# Patient Record
Sex: FEMALE | Race: WHITE | Employment: PART TIME | URBAN - METROPOLITAN AREA
[De-identification: names, ages, dates, MRNs, and addresses within clinical notes are randomized per-mention and may not be internally consistent; named-entity substitution may affect disease eponyms.]

---

## 2017-02-02 DIAGNOSIS — R52 PAIN: ICD-10-CM

## 2017-02-02 NOTE — TELEPHONE ENCOUNTER
From: Kat Ohara  To: Joan Long MD  Sent: 2/2/2017 9:07 AM CST  Subject: Medication Renewal Request    Original authorizing provider: MD Kat Salvador would like a refill of the following medications:  HYDROcodone-acetamino

## 2017-02-03 RX ORDER — TRAMADOL HYDROCHLORIDE 50 MG/1
50 TABLET ORAL AS NEEDED
Qty: 60 TABLET | Refills: 3 | Status: SHIPPED | OUTPATIENT
Start: 2017-02-03 | End: 2017-06-07

## 2017-02-03 RX ORDER — HYDROCODONE BITARTRATE AND ACETAMINOPHEN 10; 325 MG/1; MG/1
1 TABLET ORAL EVERY 4 HOURS PRN
Qty: 100 TABLET | Refills: 0 | Status: SHIPPED | OUTPATIENT
Start: 2017-02-03 | End: 2017-05-09

## 2017-02-15 NOTE — PROGRESS NOTES
HPI:    Patient ID: Nakul Schaefer is a 64year old female. HPIpt here fu on mood disturbance and insomnia. Had difficulty with sleep is persistant. Review of Systems   Constitutional: Positive for fatigue.    Musculoskeletal: Positive for back pain #[id number]

## 2017-02-21 ENCOUNTER — TELEPHONE (OUTPATIENT)
Dept: INTERNAL MEDICINE CLINIC | Facility: CLINIC | Age: 57
End: 2017-02-21

## 2017-02-23 ENCOUNTER — PATIENT MESSAGE (OUTPATIENT)
Dept: INTERNAL MEDICINE CLINIC | Facility: CLINIC | Age: 57
End: 2017-02-23

## 2017-02-23 NOTE — TELEPHONE ENCOUNTER
From: César Cobian  To: Celine Rdz MD  Sent: 2/23/2017 7:03 AM CST  Subject: Non-Urgent Medical Question    Hi I left a message Tuesday regarding intermittent family medical leave papers to be filled out. I also need a refill for Klonopin 2mg bid.

## 2017-02-24 RX ORDER — CLONAZEPAM 2 MG/1
2 TABLET ORAL 2 TIMES DAILY PRN
Qty: 60 TABLET | Refills: 0 | Status: SHIPPED | OUTPATIENT
Start: 2017-02-24 | End: 2017-03-27

## 2017-03-09 ENCOUNTER — TELEPHONE (OUTPATIENT)
Dept: INTERNAL MEDICINE CLINIC | Facility: CLINIC | Age: 57
End: 2017-03-09

## 2017-03-11 ENCOUNTER — PATIENT MESSAGE (OUTPATIENT)
Dept: INTERNAL MEDICINE CLINIC | Facility: CLINIC | Age: 57
End: 2017-03-11

## 2017-03-13 NOTE — TELEPHONE ENCOUNTER
From: Dionisio Levy  To: Alvino Bobo MD  Sent: 3/11/2017 9:21 AM CST  Subject: Prescription Question    Hi Dr. Ariel Serrano. The pharmacy needs insurance authorization before I can get the Ambien. [facility] has faxed you the request. Thanks.

## 2017-03-17 ENCOUNTER — TELEPHONE (OUTPATIENT)
Dept: INTERNAL MEDICINE CLINIC | Facility: CLINIC | Age: 57
End: 2017-03-17

## 2017-03-17 NOTE — TELEPHONE ENCOUNTER
Patient called for two reasons, if FMLA papers were completed and to discuss Ambien prescription and another prescription that is working better for her.

## 2017-03-17 NOTE — TELEPHONE ENCOUNTER
Forms in secure drawer patient to  Monday, Andrea Mcnamara and ready for  at the pharmacy, pharmacy had not called her

## 2017-03-28 RX ORDER — CLONAZEPAM 2 MG/1
TABLET ORAL
Qty: 60 TABLET | Refills: 0 | Status: SHIPPED | OUTPATIENT
Start: 2017-03-28 | End: 2017-04-25

## 2017-04-25 RX ORDER — CLONAZEPAM 2 MG/1
2 TABLET ORAL 2 TIMES DAILY PRN
Qty: 60 TABLET | Refills: 2 | Status: SHIPPED | OUTPATIENT
Start: 2017-04-25 | End: 2017-07-25

## 2017-04-25 NOTE — TELEPHONE ENCOUNTER
From: Felipa Johnson  To: Jimbo Ruelas MD  Sent: 4/24/2017 9:28 PM CDT  Subject: Medication Renewal Request    Original authorizing provider: MD Felipa Montero would like a refill of the following medications:  CLONAZEPAM 2 MG Oral

## 2017-05-09 ENCOUNTER — OFFICE VISIT (OUTPATIENT)
Dept: INTERNAL MEDICINE CLINIC | Facility: CLINIC | Age: 57
End: 2017-05-09

## 2017-05-09 VITALS
OXYGEN SATURATION: 99 % | HEART RATE: 71 BPM | TEMPERATURE: 98 F | DIASTOLIC BLOOD PRESSURE: 78 MMHG | WEIGHT: 131.19 LBS | BODY MASS INDEX: 21 KG/M2 | SYSTOLIC BLOOD PRESSURE: 110 MMHG

## 2017-05-09 DIAGNOSIS — Z00.00 WELLNESS EXAMINATION: Primary | ICD-10-CM

## 2017-05-09 DIAGNOSIS — R53.83 OTHER FATIGUE: ICD-10-CM

## 2017-05-09 DIAGNOSIS — K21.00 GASTROESOPHAGEAL REFLUX DISEASE WITH ESOPHAGITIS: ICD-10-CM

## 2017-05-09 DIAGNOSIS — M96.1 LUMBAR POST-LAMINECTOMY SYNDROME: ICD-10-CM

## 2017-05-09 DIAGNOSIS — F32.89 OTHER DEPRESSION: ICD-10-CM

## 2017-05-09 PROCEDURE — 99396 PREV VISIT EST AGE 40-64: CPT | Performed by: INTERNAL MEDICINE

## 2017-05-09 RX ORDER — HYDROCODONE BITARTRATE AND ACETAMINOPHEN 10; 325 MG/1; MG/1
1 TABLET ORAL EVERY 4 HOURS PRN
Qty: 100 TABLET | Refills: 0 | Status: SHIPPED | OUTPATIENT
Start: 2017-05-09 | End: 2017-07-25

## 2017-05-09 NOTE — PROGRESS NOTES
HPI:    Patient ID: Shobha Connelly is a 64year old female. HPIpt here for a wellness exam and referral for screenings. Is doing well with Celexa as far as mood  And sleeping better. HAs no Gi side effects. Needs referral for labs and colonoscopy. ear normal.   Left Ear: External ear normal.   Mouth/Throat: No oropharyngeal exudate. Eyes: Conjunctivae are normal. Pupils are equal, round, and reactive to light. Neck: No JVD present. No thyromegaly present.    Cardiovascular: Normal rate and regula

## 2017-06-07 DIAGNOSIS — R52 PAIN: ICD-10-CM

## 2017-06-07 RX ORDER — TRAMADOL HYDROCHLORIDE 50 MG/1
50 TABLET ORAL AS NEEDED
Qty: 60 TABLET | Refills: 3 | Status: SHIPPED | OUTPATIENT
Start: 2017-06-07 | End: 2017-06-30

## 2017-06-07 NOTE — TELEPHONE ENCOUNTER
From: Craig Blizzard  To: Elissa Schuler MD  Sent: 6/7/2017 6:48 AM CDT  Subject: Medication Renewal Request    Original authorizing provider: Larena Cordial, MD Craig Blizzard would like a refill of the following medications:  TraMADol HCl (ULTRAM)

## 2017-06-23 ENCOUNTER — NURSE ONLY (OUTPATIENT)
Dept: INTERNAL MEDICINE CLINIC | Facility: CLINIC | Age: 57
End: 2017-06-23

## 2017-06-23 DIAGNOSIS — R53.83 OTHER FATIGUE: ICD-10-CM

## 2017-06-23 DIAGNOSIS — Z00.00 WELLNESS EXAMINATION: ICD-10-CM

## 2017-06-23 PROCEDURE — 80050 GENERAL HEALTH PANEL: CPT | Performed by: INTERNAL MEDICINE

## 2017-06-23 PROCEDURE — 36415 COLL VENOUS BLD VENIPUNCTURE: CPT | Performed by: INTERNAL MEDICINE

## 2017-06-23 PROCEDURE — 80061 LIPID PANEL: CPT | Performed by: INTERNAL MEDICINE

## 2017-06-30 DIAGNOSIS — R52 PAIN: ICD-10-CM

## 2017-06-30 RX ORDER — TRAMADOL HYDROCHLORIDE 50 MG/1
50 TABLET ORAL AS NEEDED
Qty: 60 TABLET | Refills: 3
Start: 2017-06-30 | End: 2017-10-03

## 2017-06-30 RX ORDER — TRAMADOL HYDROCHLORIDE 50 MG/1
50 TABLET ORAL EVERY 6 HOURS PRN
Qty: 60 TABLET | Refills: 3 | Status: SHIPPED | OUTPATIENT
Start: 2017-06-30 | End: 2017-07-25

## 2017-06-30 NOTE — ADDENDUM NOTE
Encounter addended by: Regis Mayorga MD on: 6/30/2017 12:05 PM<BR>    Actions taken:  activity accessed

## 2017-06-30 NOTE — TELEPHONE ENCOUNTER
From: Craig Blizzard  Sent: 6/30/2017 10:47 AM CDT  Subject: Medication Renewal Request    Craig Blizzard would like a refill of the following medications:  TraMADol HCl (ULTRAM) 50 MG Oral Tab Natalio Thompson MD]    Preferred pharmacy: [facility]/PHARMACY #2

## 2017-07-24 ENCOUNTER — HOSPITAL ENCOUNTER (OUTPATIENT)
Dept: MAMMOGRAPHY | Facility: HOSPITAL | Age: 57
Discharge: HOME | End: 2017-07-24
Attending: INTERNAL MEDICINE
Payer: COMMERCIAL

## 2017-07-24 DIAGNOSIS — Z00.00 WELLNESS EXAMINATION: ICD-10-CM

## 2017-07-24 PROCEDURE — 77067 SCR MAMMO BI INCL CAD: CPT | Performed by: INTERNAL MEDICINE

## 2017-07-25 NOTE — PROGRESS NOTES
HPI:    Patient ID: Maci Mojica is a 64year old female. HPIpt here for a fu on depressive disorder and insomnia and anxiety. Did very well with Celexa but made sleep much worse.   Weaned of celexa and is starting to slip back into some anxiety issue exhibits tenderness (lumbar). Neurological: She is alert and oriented to person, place, and time. Skin: No rash noted. No erythema. No pallor. Psychiatric: She has a normal mood and affect.               ASSESSMENT/PLAN:   Other depression  (primary e

## 2017-08-21 RX ORDER — ESCITALOPRAM OXALATE 10 MG/1
TABLET ORAL
Qty: 30 TABLET | Refills: 1 | Status: SHIPPED
Start: 2017-08-21 | End: 2017-08-21 | Stop reason: DRUGHIGH

## 2017-08-21 RX ORDER — ESCITALOPRAM OXALATE 20 MG/1
20 TABLET ORAL DAILY
Qty: 30 TABLET | Refills: 1 | Status: SHIPPED | OUTPATIENT
Start: 2017-08-21 | End: 2017-10-29

## 2017-08-21 NOTE — TELEPHONE ENCOUNTER
From: Marialuisa Liao  Sent: 8/21/2017 9:53 AM CDT  Subject: Medication Renewal Request    Marialuisa Liao would like a refill of the following medications:  escitalopram 10 MG Oral Tab Ramez Gonzales MD]    Preferred pharmacy: [facility]/PHARMACY #[id number] [facility]

## 2017-08-21 NOTE — TELEPHONE ENCOUNTER
Pt received rx for escitalopram 10mg. Pt states rx was suppose to be inc to 20mg 1 tablet daily.  Please advise

## 2017-08-23 ENCOUNTER — PATIENT MESSAGE (OUTPATIENT)
Dept: INTERNAL MEDICINE CLINIC | Facility: CLINIC | Age: 57
End: 2017-08-23

## 2017-08-23 RX ORDER — CLONAZEPAM 2 MG/1
TABLET ORAL
Qty: 60 TABLET | Refills: 2 | Status: SHIPPED | OUTPATIENT
Start: 2017-08-23 | End: 2017-11-19

## 2017-08-23 NOTE — TELEPHONE ENCOUNTER
From: Kat Ohara  To: Joan Long MD  Sent: 8/23/2017 8:50 AM CDT  Subject: Prescription Question    Hi Dr. Cynthia Alatorre. I need a refill of clonazepam. I take 2mg twice a day.  I don't know why it's not on my med list. I also take Bijal Blount but don't need

## 2017-09-01 ENCOUNTER — TELEPHONE (OUTPATIENT)
Dept: INTERNAL MEDICINE CLINIC | Facility: CLINIC | Age: 57
End: 2017-09-01

## 2017-09-11 ENCOUNTER — PATIENT MESSAGE (OUTPATIENT)
Dept: INTERNAL MEDICINE CLINIC | Facility: CLINIC | Age: 57
End: 2017-09-11

## 2017-09-12 RX ORDER — ZOLPIDEM TARTRATE 10 MG/1
10 TABLET ORAL NIGHTLY
Qty: 30 TABLET | Refills: 0 | Status: SHIPPED | OUTPATIENT
Start: 2017-09-12 | End: 2017-10-03

## 2017-09-12 NOTE — TELEPHONE ENCOUNTER
From: Quang Pham  To: Britton Hurley MD  Sent: 9/11/2017 9:52 PM CDT  Subject: Prescription Question    Hi I currently take Ambien 10 mg(generic). It's not on my med list but I would like a refill. Thanks.    [facility] AT [facility]

## 2017-10-03 ENCOUNTER — OFFICE VISIT (OUTPATIENT)
Dept: INTERNAL MEDICINE CLINIC | Facility: CLINIC | Age: 57
End: 2017-10-03

## 2017-10-03 VITALS
BODY MASS INDEX: 20.88 KG/M2 | SYSTOLIC BLOOD PRESSURE: 122 MMHG | HEART RATE: 84 BPM | TEMPERATURE: 100 F | OXYGEN SATURATION: 98 % | DIASTOLIC BLOOD PRESSURE: 66 MMHG | HEIGHT: 67 IN | WEIGHT: 133 LBS | RESPIRATION RATE: 16 BRPM

## 2017-10-03 DIAGNOSIS — M54.17 LUMBOSACRAL RADICULOPATHY: Primary | ICD-10-CM

## 2017-10-03 DIAGNOSIS — R52 PAIN: ICD-10-CM

## 2017-10-03 PROCEDURE — 99213 OFFICE O/P EST LOW 20 MIN: CPT | Performed by: INTERNAL MEDICINE

## 2017-10-03 RX ORDER — ZOLPIDEM TARTRATE 10 MG/1
10 TABLET ORAL NIGHTLY
Qty: 90 TABLET | Refills: 3 | Status: SHIPPED | OUTPATIENT
Start: 2017-10-03 | End: 2018-04-03

## 2017-10-03 RX ORDER — TRAMADOL HYDROCHLORIDE 50 MG/1
50 TABLET ORAL AS NEEDED
Qty: 60 TABLET | Refills: 3 | Status: SHIPPED | OUTPATIENT
Start: 2017-10-03 | End: 2018-02-14 | Stop reason: CLARIF

## 2017-10-03 RX ORDER — HYDROCODONE BITARTRATE AND ACETAMINOPHEN 10; 325 MG/1; MG/1
1 TABLET ORAL EVERY 4 HOURS PRN
Qty: 100 TABLET | Refills: 0 | Status: SHIPPED | OUTPATIENT
Start: 2017-10-03 | End: 2017-12-04

## 2017-10-03 NOTE — PROGRESS NOTES
HPI:    Patient ID: Lulú Lacy is a 64year old female. Pt here for a followup on sleep problems, SADE and depression. Had bumped up Lexapro to 20 mg .   Has been feeling better with antidepressant rx  Review of Systems   Constitutional: Negative fo upset   PHYSICAL EXAM:   Physical Exam    Constitutional: She is oriented to person, place, and time. She appears well-developed and well-nourished. HENT:   Head: Normocephalic and atraumatic.    Right Ear: Tympanic membrane and ear canal normal.   Nose:

## 2017-10-30 RX ORDER — ESCITALOPRAM OXALATE 20 MG/1
20 TABLET ORAL DAILY
Qty: 30 TABLET | Refills: 3 | Status: SHIPPED | OUTPATIENT
Start: 2017-10-30 | End: 2017-11-01

## 2017-10-30 NOTE — TELEPHONE ENCOUNTER
From: Dami Rick  Sent: 10/29/2017 2:52 PM CDT  Subject: Medication Renewal Request    Dami Rick would like a refill of the following medications:     escitalopram 20 MG Oral Tab Isela Nesbitt MD]    Preferred pharmacy: [facility]/PHARMACY #[id number] - [address] [address]  AT [address] [place] [address], [phone]

## 2017-11-01 ENCOUNTER — PATIENT MESSAGE (OUTPATIENT)
Dept: INTERNAL MEDICINE CLINIC | Facility: CLINIC | Age: 57
End: 2017-11-01

## 2017-11-01 RX ORDER — ESCITALOPRAM OXALATE 10 MG/1
TABLET ORAL
COMMUNITY
Start: 2017-08-13 | End: 2018-05-01

## 2017-11-01 NOTE — TELEPHONE ENCOUNTER
From: Felipa Johnson  To: Jimbo Ruelas MD  Sent: 11/1/2017 6:24 PM CDT  Subject: Prescription Question    Hi I sent a request for a refill of my Lexapro. Dr. Roseline Nevarez is my PCP. I am about to run out, I sent the request Monday.  Since this medication is

## 2017-11-03 RX ORDER — ESCITALOPRAM OXALATE 20 MG/1
20 TABLET ORAL DAILY
Qty: 30 TABLET | Refills: 3 | Status: SHIPPED | OUTPATIENT
Start: 2017-11-03 | End: 2018-05-01

## 2017-11-21 ENCOUNTER — TELEPHONE (OUTPATIENT)
Dept: INTERNAL MEDICINE CLINIC | Facility: CLINIC | Age: 57
End: 2017-11-21

## 2017-11-21 RX ORDER — CLONAZEPAM 2 MG/1
2 TABLET ORAL 2 TIMES DAILY PRN
Qty: 60 TABLET | Refills: 0
Start: 2017-11-21 | End: 2017-12-15

## 2017-11-21 NOTE — TELEPHONE ENCOUNTER
Per Dr. Percy Sal called to give verbal order for clonazepam refill- would not go through to print the script- spoke with pharmacist and informed of approval covering for Dr. Iva Peck

## 2017-12-05 RX ORDER — HYDROCODONE BITARTRATE AND ACETAMINOPHEN 10; 325 MG/1; MG/1
1 TABLET ORAL EVERY 4 HOURS PRN
Qty: 100 TABLET | Refills: 0
Start: 2017-12-05 | End: 2017-12-07

## 2017-12-05 NOTE — TELEPHONE ENCOUNTER
From: Sarika Elias  Sent: 12/4/2017 8:18 PM CST  Subject: Medication Renewal Request    Sarika Elias would like a refill of the following medications:     HYDROcodone-acetaminophen  MG Oral Tab Rosa Isela Patel MD]    [facility]

## 2017-12-07 RX ORDER — HYDROCODONE BITARTRATE AND ACETAMINOPHEN 10; 325 MG/1; MG/1
1 TABLET ORAL EVERY 6 HOURS PRN
Qty: 100 TABLET | Refills: 0 | Status: SHIPPED | OUTPATIENT
Start: 2017-12-07 | End: 2018-02-14

## 2017-12-15 RX ORDER — CLONAZEPAM 2 MG/1
2 TABLET ORAL 2 TIMES DAILY PRN
Qty: 60 TABLET | Refills: 1
Start: 2017-12-15 | End: 2017-12-20

## 2017-12-20 RX ORDER — CLONAZEPAM 2 MG/1
2 TABLET ORAL 2 TIMES DAILY PRN
Qty: 60 TABLET | Refills: 1
Start: 2017-12-20 | End: 2018-01-14

## 2017-12-21 ENCOUNTER — TELEPHONE (OUTPATIENT)
Dept: INTERNAL MEDICINE CLINIC | Facility: CLINIC | Age: 57
End: 2017-12-21

## 2018-01-14 RX ORDER — CLONAZEPAM 2 MG/1
2 TABLET ORAL 2 TIMES DAILY PRN
Qty: 60 TABLET | Refills: 1
Start: 2018-01-14

## 2018-01-15 RX ORDER — CLONAZEPAM 2 MG/1
2 TABLET ORAL 2 TIMES DAILY PRN
Qty: 60 TABLET | Refills: 1
Start: 2018-01-15 | End: 2018-01-18

## 2018-01-15 RX ORDER — CLONAZEPAM 2 MG/1
2 TABLET ORAL 2 TIMES DAILY PRN
Qty: 60 TABLET | Refills: 1 | Status: CANCELLED
Start: 2018-01-15

## 2018-01-15 NOTE — TELEPHONE ENCOUNTER
From: Marialuisa Liao  Sent: 1/14/2018 9:18 PM CST  Subject: Medication Renewal Request    Marialuisa Liao would like a refill of the following medications:     ClonazePAM 2 MG Oral Tab Veronica Gamez MD]    Preferred pharmacy: [facility]/PHARMACY #[id number] - [place]

## 2018-01-15 NOTE — TELEPHONE ENCOUNTER
From: Enrike Chapa  Sent: 1/15/2018 8:15 AM CST  Subject: Medication Renewal Request    Enrike Chapa would like a refill of the following medications:     ClonazePAM 2 MG Oral Tab Natanael Jackman MD]   Patient Comment: Need this week    Preferred ph

## 2018-01-18 ENCOUNTER — PATIENT MESSAGE (OUTPATIENT)
Dept: INTERNAL MEDICINE CLINIC | Facility: CLINIC | Age: 58
End: 2018-01-18

## 2018-01-18 NOTE — TELEPHONE ENCOUNTER
From: Arlene Miller  To: Princess Velasquez MD  Sent: 1/18/2018 11:02 AM CST  Subject: Prescription Question    Hi I got a message saying the klonopin [facility]ill [facility]. They have not received it and I got the message Monday.  Please resend or

## 2018-01-19 RX ORDER — CLONAZEPAM 2 MG/1
2 TABLET ORAL 2 TIMES DAILY PRN
Qty: 60 TABLET | Refills: 1 | Status: SHIPPED | OUTPATIENT
Start: 2018-01-19 | End: 2018-05-09

## 2018-02-14 ENCOUNTER — TELEPHONE (OUTPATIENT)
Dept: INTERNAL MEDICINE CLINIC | Facility: CLINIC | Age: 58
End: 2018-02-14

## 2018-02-14 DIAGNOSIS — R52 PAIN: ICD-10-CM

## 2018-02-14 RX ORDER — HYDROCODONE BITARTRATE AND ACETAMINOPHEN 10; 325 MG/1; MG/1
1 TABLET ORAL EVERY 6 HOURS PRN
Qty: 100 TABLET | Refills: 0 | Status: SHIPPED | OUTPATIENT
Start: 2018-02-14 | End: 2018-05-01

## 2018-02-14 RX ORDER — HYDROCODONE BITARTRATE AND ACETAMINOPHEN 10; 325 MG/1; MG/1
1 TABLET ORAL EVERY 6 HOURS PRN
Qty: 100 TABLET | Refills: 0
Start: 2018-02-14 | End: 2018-02-14

## 2018-02-14 RX ORDER — TRAMADOL HYDROCHLORIDE 50 MG/1
50 TABLET ORAL EVERY 6 HOURS PRN
Qty: 90 TABLET | Refills: 0 | Status: SHIPPED | OUTPATIENT
Start: 2018-02-14 | End: 2018-08-07

## 2018-02-14 RX ORDER — TRAMADOL HYDROCHLORIDE 50 MG/1
50 TABLET ORAL AS NEEDED
Qty: 60 TABLET | Refills: 3
Start: 2018-02-14 | End: 2018-03-25

## 2018-02-14 NOTE — TELEPHONE ENCOUNTER
From: Andrea Nicholas  Sent: 2/14/2018 2:13 PM CST  Subject: Medication Renewal Request    Andrea Nicholas would like a refill of the following medications:     HYDROcodone-acetaminophen  MG Oral Tab Yissel Vivar DO]    [facility]

## 2018-02-14 NOTE — TELEPHONE ENCOUNTER
From: Jericho Rowley  Sent: 2/14/2018 2:13 PM CST  Subject: Medication Renewal Request    Jericho Rowley would like a refill of the following medications:     TraMADol HCl (ULTRAM) 50 MG Oral Tab Tereza Howard MD]    Preferred pharmacy: [facility]/PHARMACY

## 2018-02-14 NOTE — TELEPHONE ENCOUNTER
Called Pt and informed Norco and Tramadol Rxs ready for - informed her we are at [facility] location today- Pt requesting to  at  [employer] Location- informed will take to office and leave at  tomorrow for - she will  tomorro

## 2018-03-25 DIAGNOSIS — R52 PAIN: ICD-10-CM

## 2018-03-26 RX ORDER — TRAMADOL HYDROCHLORIDE 50 MG/1
50 TABLET ORAL AS NEEDED
Qty: 60 TABLET | Refills: 3
Start: 2018-03-26 | End: 2018-03-27

## 2018-03-26 NOTE — TELEPHONE ENCOUNTER
From: Billie Espinal  Sent: 3/25/2018 1:36 PM CDT  Subject: Medication Renewal Request    Billie Espinal would like a refill of the following medications:     TraMADol HCl (ULTRAM) 50 MG Oral Tab Jose Luis Narayanan MD]    Preferred pharmacy: [facility]/PHARMACY

## 2018-03-27 DIAGNOSIS — R52 PAIN: ICD-10-CM

## 2018-03-27 RX ORDER — TRAMADOL HYDROCHLORIDE 50 MG/1
50 TABLET ORAL AS NEEDED
Qty: 60 TABLET | Refills: 3 | Status: SHIPPED | OUTPATIENT
Start: 2018-03-27 | End: 2018-07-29

## 2018-04-04 RX ORDER — ZOLPIDEM TARTRATE 10 MG/1
10 TABLET ORAL NIGHTLY
Qty: 90 TABLET | Refills: 3
Start: 2018-04-04 | End: 2018-04-11

## 2018-04-04 NOTE — TELEPHONE ENCOUNTER
From: Kat Ohara  Sent: 4/3/2018 7:40 AM CDT  Subject: Medication Renewal Request    Kat Ohara would like a refill of the following medications:     Zolpidem Tartrate 10 MG Oral Tab Carolyne Fox MD]   Patient Comment: Please send to pharmac

## 2018-04-05 ENCOUNTER — TELEPHONE (OUTPATIENT)
Dept: INTERNAL MEDICINE CLINIC | Facility: CLINIC | Age: 58
End: 2018-04-05

## 2018-04-11 ENCOUNTER — PATIENT MESSAGE (OUTPATIENT)
Dept: INTERNAL MEDICINE CLINIC | Facility: CLINIC | Age: 58
End: 2018-04-11

## 2018-04-11 NOTE — TELEPHONE ENCOUNTER
From: Julia Caceres  To: Boy Jama MD  Sent: 4/11/2018 3:14 PM CDT  Subject: Prescription Question    On my last refill of zolpidem, my insurance is asking for pre-authorization from the doctor. I think it can be sent to the pharmacy. Thanks.    Walter Randolph

## 2018-04-13 RX ORDER — ZOLPIDEM TARTRATE 10 MG/1
10 TABLET ORAL NIGHTLY
Qty: 90 TABLET | Refills: 3 | Status: SHIPPED | OUTPATIENT
Start: 2018-04-13 | End: 2018-10-17

## 2018-05-01 ENCOUNTER — OFFICE VISIT (OUTPATIENT)
Dept: INTERNAL MEDICINE CLINIC | Facility: CLINIC | Age: 58
End: 2018-05-01

## 2018-05-01 VITALS
DIASTOLIC BLOOD PRESSURE: 64 MMHG | RESPIRATION RATE: 17 BRPM | TEMPERATURE: 99 F | SYSTOLIC BLOOD PRESSURE: 118 MMHG | HEIGHT: 67 IN | WEIGHT: 130 LBS | HEART RATE: 77 BPM | OXYGEN SATURATION: 99 % | BODY MASS INDEX: 20.4 KG/M2

## 2018-05-01 DIAGNOSIS — Z12.11 COLON CANCER SCREENING: ICD-10-CM

## 2018-05-01 DIAGNOSIS — M81.8 OSTEOPOROSIS OF DISUSE: ICD-10-CM

## 2018-05-01 DIAGNOSIS — F32.9 REACTIVE DEPRESSION: ICD-10-CM

## 2018-05-01 DIAGNOSIS — Z00.00 WELLNESS EXAMINATION: Primary | ICD-10-CM

## 2018-05-01 DIAGNOSIS — M47.816 LUMBAR SPONDYLOSIS: ICD-10-CM

## 2018-05-01 PROCEDURE — 99396 PREV VISIT EST AGE 40-64: CPT | Performed by: INTERNAL MEDICINE

## 2018-05-01 PROCEDURE — 88175 CYTOPATH C/V AUTO FLUID REDO: CPT | Performed by: INTERNAL MEDICINE

## 2018-05-01 RX ORDER — HYDROCODONE BITARTRATE AND ACETAMINOPHEN 10; 325 MG/1; MG/1
1 TABLET ORAL EVERY 6 HOURS PRN
Qty: 100 TABLET | Refills: 0 | Status: SHIPPED | OUTPATIENT
Start: 2018-05-01 | End: 2018-07-12

## 2018-05-01 RX ORDER — ESCITALOPRAM OXALATE 20 MG/1
20 TABLET ORAL DAILY
Qty: 90 TABLET | Refills: 3 | Status: SHIPPED | OUTPATIENT
Start: 2018-05-01 | End: 2019-03-22

## 2018-05-01 NOTE — PROGRESS NOTES
HPI:    Patient ID: Noel Spicer is a 62year old female. Patient here for annual wellness exam and fu on depression and anxiety and sleep disturbance. Is on Lexapro 20 mg and Klonopin for breakthrough anxiety. Sleep disturbance.   Still occasionall needed for Pain. Disp: 90 tablet Rfl: 0   ClonazePAM 2 MG Oral Tab Take 1 tablet (2 mg total) by mouth 2 (two) times daily as needed for Anxiety. Disp: 60 tablet Rfl: 1   Multiple Vitamins-Minerals (WOMENS ONE DAILY) Oral Tab Take  by mouth.  Disp:  Rfl: tablet by mouth every 6 (six) hours as needed for Pain.            Imaging & Referrals:  GASTRO - INTERNAL  MARICHUY SCREENING BILAT (CPT=77067)  XR DEXA BONE DENSITOMETRY (CPT=77080)               Samuel Evangelista MD  5/1/2018

## 2018-05-03 ENCOUNTER — TELEPHONE (OUTPATIENT)
Dept: INTERNAL MEDICINE CLINIC | Facility: CLINIC | Age: 58
End: 2018-05-03

## 2018-05-03 NOTE — TELEPHONE ENCOUNTER
Approved #90 for 90 pharmacy notified, patient picked up and paid for #30 pills out of pocket they are calling her

## 2018-05-09 RX ORDER — CLONAZEPAM 2 MG/1
2 TABLET ORAL 2 TIMES DAILY PRN
Qty: 60 TABLET | Refills: 1
Start: 2018-05-09 | End: 2018-05-15

## 2018-05-09 NOTE — TELEPHONE ENCOUNTER
From: Arlene Miller  Sent: 5/9/2018 10:28 AM CDT  Subject: Medication Renewal Request    Arlene Miller would like a refill of the following medications:     ClonazePAM 2 MG Oral Tab Susan Luna MD]    Preferred pharmacy: [facility]/PHARMACY #[id number] [facility]

## 2018-05-15 RX ORDER — CLONAZEPAM 2 MG/1
2 TABLET ORAL 2 TIMES DAILY PRN
Qty: 60 TABLET | Refills: 1 | Status: SHIPPED | OUTPATIENT
Start: 2018-05-15 | End: 2018-07-12

## 2018-07-13 RX ORDER — HYDROCODONE BITARTRATE AND ACETAMINOPHEN 10; 325 MG/1; MG/1
1 TABLET ORAL EVERY 6 HOURS PRN
Qty: 100 TABLET | Refills: 0
Start: 2018-07-13 | End: 2018-07-13

## 2018-07-13 RX ORDER — CLONAZEPAM 2 MG/1
2 TABLET ORAL 2 TIMES DAILY PRN
Qty: 60 TABLET | Refills: 1
Start: 2018-07-13 | End: 2018-07-13

## 2018-07-13 NOTE — TELEPHONE ENCOUNTER
From: Ben Paul  Sent: 7/12/2018 5:50 PM CDT  Subject: Medication Renewal Request    Ben Paul would like a refill of the following medications:     HYDROcodone-acetaminophen  MG Oral Tab [Lupe Mullins MD]     ClonazePAM 2 MG Oral Tab

## 2018-07-26 ENCOUNTER — NURSE ONLY (OUTPATIENT)
Dept: INTERNAL MEDICINE CLINIC | Facility: CLINIC | Age: 58
End: 2018-07-26
Payer: COMMERCIAL

## 2018-07-26 DIAGNOSIS — Z00.00 WELLNESS EXAMINATION: ICD-10-CM

## 2018-07-26 LAB
ALBUMIN SERPL BCP-MCNC: 4 G/DL (ref 3.5–4.8)
ALBUMIN/GLOB SERPL: 1.3 {RATIO} (ref 1–2)
ALP SERPL-CCNC: 47 U/L (ref 32–100)
ALT SERPL-CCNC: 18 U/L (ref 14–54)
ANION GAP SERPL CALC-SCNC: 8 MMOL/L (ref 0–18)
AST SERPL-CCNC: 21 U/L (ref 15–41)
BASOPHILS # BLD: 0.1 K/UL (ref 0–0.2)
BASOPHILS NFR BLD: 2 %
BILIRUB SERPL-MCNC: 0.6 MG/DL (ref 0.3–1.2)
BUN SERPL-MCNC: 9 MG/DL (ref 8–20)
BUN/CREAT SERPL: 8.8 (ref 10–20)
CALCIUM SERPL-MCNC: 9.5 MG/DL (ref 8.5–10.5)
CHLORIDE SERPL-SCNC: 105 MMOL/L (ref 95–110)
CHOLEST SERPL-MCNC: 237 MG/DL (ref 110–200)
CO2 SERPL-SCNC: 28 MMOL/L (ref 22–32)
CREAT SERPL-MCNC: 1.02 MG/DL (ref 0.5–1.5)
EOSINOPHIL # BLD: 0.1 K/UL (ref 0–0.7)
EOSINOPHIL NFR BLD: 3 %
ERYTHROCYTE [DISTWIDTH] IN BLOOD BY AUTOMATED COUNT: 12.4 % (ref 11–15)
GLOBULIN PLAS-MCNC: 3.1 G/DL (ref 2.5–3.7)
GLUCOSE SERPL-MCNC: 88 MG/DL (ref 70–99)
HCT VFR BLD AUTO: 44.2 % (ref 35–48)
HDLC SERPL-MCNC: 62 MG/DL
HGB BLD-MCNC: 14.9 G/DL (ref 12–16)
LDLC SERPL CALC-MCNC: 139 MG/DL (ref 0–99)
LYMPHOCYTES # BLD: 2 K/UL (ref 1–4)
LYMPHOCYTES NFR BLD: 44 %
MCH RBC QN AUTO: 31.3 PG (ref 27–32)
MCHC RBC AUTO-ENTMCNC: 33.8 G/DL (ref 32–37)
MCV RBC AUTO: 92.6 FL (ref 80–100)
MONOCYTES # BLD: 0.5 K/UL (ref 0–1)
MONOCYTES NFR BLD: 10 %
NEUTROPHILS # BLD AUTO: 1.8 K/UL (ref 1.8–7.7)
NEUTROPHILS NFR BLD: 41 %
NONHDLC SERPL-MCNC: 175 MG/DL
OSMOLALITY UR CALC.SUM OF ELEC: 290 MOSM/KG (ref 275–295)
PATIENT FASTING: YES
PLATELET # BLD AUTO: 249 K/UL (ref 140–400)
PMV BLD AUTO: 8.3 FL (ref 7.4–10.3)
POTASSIUM SERPL-SCNC: 4.4 MMOL/L (ref 3.3–5.1)
PROT SERPL-MCNC: 7.1 G/DL (ref 5.9–8.4)
RBC # BLD AUTO: 4.77 M/UL (ref 3.7–5.4)
SODIUM SERPL-SCNC: 141 MMOL/L (ref 136–144)
TRIGL SERPL-MCNC: 178 MG/DL (ref 1–149)
TSH SERPL-ACNC: 1.5 UIU/ML (ref 0.45–5.33)
WBC # BLD AUTO: 4.4 K/UL (ref 4–11)

## 2018-07-26 PROCEDURE — 80061 LIPID PANEL: CPT | Performed by: INTERNAL MEDICINE

## 2018-07-26 PROCEDURE — 80050 GENERAL HEALTH PANEL: CPT | Performed by: INTERNAL MEDICINE

## 2018-07-26 PROCEDURE — 36415 COLL VENOUS BLD VENIPUNCTURE: CPT | Performed by: INTERNAL MEDICINE

## 2018-07-26 NOTE — PROGRESS NOTES
Pt's  verified  Pt presented for a fasting blood draw. Per DR Shirley Flores ordered TSHR CBC CMP LIPID. Pt tolerated venipuncture well,specimens drawn from RT  arm  and sent out to [address] [place] [address] lab for testing.

## 2018-07-29 DIAGNOSIS — R52 PAIN: ICD-10-CM

## 2018-07-30 ENCOUNTER — TELEPHONE (OUTPATIENT)
Dept: INTERNAL MEDICINE CLINIC | Facility: CLINIC | Age: 58
End: 2018-07-30

## 2018-07-30 RX ORDER — TRAMADOL HYDROCHLORIDE 50 MG/1
50 TABLET ORAL AS NEEDED
Qty: 60 TABLET | Refills: 3
Start: 2018-07-30 | End: 2018-07-30

## 2018-07-30 NOTE — TELEPHONE ENCOUNTER
Pt's  verified  Pt l/m on nurse line stating she received a msg on mychart that Rx is ready for pickup- wants it faxed to [facility]- per Dr Rebecca Ardon RX tramadol faxed- notified pt via [employer]

## 2018-07-30 NOTE — TELEPHONE ENCOUNTER
Patient called and left  on Nurse's line stating that she received a message on [facility] that her script was ready to be picked up. Patient would like for the script to be faxed to her pharmacy and given a call when it has been faxed.

## 2018-08-07 ENCOUNTER — OFFICE VISIT (OUTPATIENT)
Dept: INTERNAL MEDICINE CLINIC | Facility: CLINIC | Age: 58
End: 2018-08-07
Payer: COMMERCIAL

## 2018-08-07 VITALS
HEART RATE: 74 BPM | OXYGEN SATURATION: 98 % | BODY MASS INDEX: 21.66 KG/M2 | TEMPERATURE: 99 F | WEIGHT: 138 LBS | SYSTOLIC BLOOD PRESSURE: 120 MMHG | DIASTOLIC BLOOD PRESSURE: 78 MMHG | HEIGHT: 67 IN

## 2018-08-07 DIAGNOSIS — F41.1 GAD (GENERALIZED ANXIETY DISORDER): ICD-10-CM

## 2018-08-07 DIAGNOSIS — F32.89 OTHER DEPRESSION: ICD-10-CM

## 2018-08-07 DIAGNOSIS — M96.1 LUMBAR POST-LAMINECTOMY SYNDROME: ICD-10-CM

## 2018-08-07 DIAGNOSIS — M47.816 LUMBAR SPONDYLOSIS: Primary | ICD-10-CM

## 2018-08-07 PROCEDURE — 99214 OFFICE O/P EST MOD 30 MIN: CPT | Performed by: INTERNAL MEDICINE

## 2018-08-07 NOTE — PROGRESS NOTES
HPI:    Patient ID: Ramos Lundy is a 62year old female. Pt here for a followup on antianxiety and antidepressant rx- still has need to take occasional breakthrough . Still suffers from some breakthrough anxiety especially in the Am.   Still on zolp alert and oriented to person, place, and time. Skin: No rash noted. Psychiatric: She has a normal mood and affect.               ASSESSMENT/PLAN:   Lumbar spondylosis  (primary encounter diagnosis) chronic pain - Norco - tramadol prn - using exceedingly

## 2018-09-11 RX ORDER — HYDROCODONE BITARTRATE AND ACETAMINOPHEN 10; 325 MG/1; MG/1
1 TABLET ORAL EVERY 6 HOURS PRN
Qty: 100 TABLET | Refills: 0
Start: 2018-09-11 | End: 2018-09-14

## 2018-09-11 RX ORDER — CLONAZEPAM 2 MG/1
2 TABLET ORAL 2 TIMES DAILY PRN
Qty: 60 TABLET | Refills: 1
Start: 2018-09-11 | End: 2018-09-14

## 2018-09-15 ENCOUNTER — TELEPHONE (OUTPATIENT)
Dept: INTERNAL MEDICINE CLINIC | Facility: CLINIC | Age: 58
End: 2018-09-15

## 2018-09-22 ENCOUNTER — TELEPHONE (OUTPATIENT)
Dept: INTERNAL MEDICINE CLINIC | Facility: CLINIC | Age: 58
End: 2018-09-22

## 2018-09-23 ENCOUNTER — HOSPITAL ENCOUNTER (INPATIENT)
Facility: HOSPITAL | Age: 58
LOS: 18 days | Discharge: HOME | DRG: 330 | End: 2018-10-11
Attending: EMERGENCY MEDICINE | Admitting: HOSPITALIST
Payer: COMMERCIAL

## 2018-09-23 ENCOUNTER — APPOINTMENT (OUTPATIENT)
Dept: GENERAL RADIOLOGY | Facility: HOSPITAL | Age: 58
DRG: 330 | End: 2018-09-23
Attending: EMERGENCY MEDICINE
Payer: COMMERCIAL

## 2018-09-23 ENCOUNTER — ANESTHESIA EVENT (OUTPATIENT)
Dept: SURGERY | Facility: HOSPITAL | Age: 58
DRG: 330 | End: 2018-09-23
Payer: COMMERCIAL

## 2018-09-23 ENCOUNTER — ANESTHESIA (OUTPATIENT)
Dept: SURGERY | Facility: HOSPITAL | Age: 58
DRG: 330 | End: 2018-09-23
Payer: COMMERCIAL

## 2018-09-23 ENCOUNTER — APPOINTMENT (OUTPATIENT)
Dept: CT IMAGING | Facility: HOSPITAL | Age: 58
DRG: 330 | End: 2018-09-23
Attending: EMERGENCY MEDICINE
Payer: COMMERCIAL

## 2018-09-23 DIAGNOSIS — K35.80 ACUTE APPENDICITIS: ICD-10-CM

## 2018-09-23 DIAGNOSIS — K35.30 ACUTE APPENDICITIS WITH LOCALIZED PERITONITIS: Primary | ICD-10-CM

## 2018-09-23 PROCEDURE — 99222 1ST HOSP IP/OBS MODERATE 55: CPT | Performed by: HOSPITALIST

## 2018-09-23 PROCEDURE — 0DBH4ZZ EXCISION OF CECUM, PERCUTANEOUS ENDOSCOPIC APPROACH: ICD-10-PCS | Performed by: SURGERY

## 2018-09-23 PROCEDURE — 0DTJ4ZZ RESECTION OF APPENDIX, PERCUTANEOUS ENDOSCOPIC APPROACH: ICD-10-PCS | Performed by: SURGERY

## 2018-09-23 PROCEDURE — 74177 CT ABD & PELVIS W/CONTRAST: CPT | Performed by: EMERGENCY MEDICINE

## 2018-09-23 PROCEDURE — 71045 X-RAY EXAM CHEST 1 VIEW: CPT | Performed by: EMERGENCY MEDICINE

## 2018-09-23 RX ORDER — DEXAMETHASONE SODIUM PHOSPHATE 4 MG/ML
VIAL (ML) INJECTION AS NEEDED
Status: DISCONTINUED | OUTPATIENT
Start: 2018-09-23 | End: 2018-09-23 | Stop reason: SURG

## 2018-09-23 RX ORDER — 0.9 % SODIUM CHLORIDE 0.9 %
VIAL (ML) INJECTION
Status: COMPLETED
Start: 2018-09-23 | End: 2018-09-23

## 2018-09-23 RX ORDER — MORPHINE SULFATE 4 MG/ML
INJECTION, SOLUTION INTRAMUSCULAR; INTRAVENOUS
Status: COMPLETED
Start: 2018-09-23 | End: 2018-09-23

## 2018-09-23 RX ORDER — HYDROCODONE BITARTRATE AND ACETAMINOPHEN 5; 325 MG/1; MG/1
2 TABLET ORAL AS NEEDED
Status: DISCONTINUED | OUTPATIENT
Start: 2018-09-23 | End: 2018-09-23 | Stop reason: HOSPADM

## 2018-09-23 RX ORDER — GLYCOPYRROLATE 0.2 MG/ML
INJECTION INTRAMUSCULAR; INTRAVENOUS AS NEEDED
Status: DISCONTINUED | OUTPATIENT
Start: 2018-09-23 | End: 2018-09-23 | Stop reason: SURG

## 2018-09-23 RX ORDER — MORPHINE SULFATE 4 MG/ML
5 INJECTION, SOLUTION INTRAMUSCULAR; INTRAVENOUS EVERY 2 HOUR PRN
Status: DISCONTINUED | OUTPATIENT
Start: 2018-09-23 | End: 2018-09-23

## 2018-09-23 RX ORDER — MORPHINE SULFATE 2 MG/ML
3 INJECTION, SOLUTION INTRAMUSCULAR; INTRAVENOUS EVERY 4 HOURS PRN
Status: DISCONTINUED | OUTPATIENT
Start: 2018-09-23 | End: 2018-09-23

## 2018-09-23 RX ORDER — SODIUM CHLORIDE 0.9 % (FLUSH) 0.9 %
10 SYRINGE (ML) INJECTION AS NEEDED
Status: DISCONTINUED | OUTPATIENT
Start: 2018-09-23 | End: 2018-10-11

## 2018-09-23 RX ORDER — HYDROMORPHONE HYDROCHLORIDE 1 MG/ML
INJECTION, SOLUTION INTRAMUSCULAR; INTRAVENOUS; SUBCUTANEOUS
Status: COMPLETED
Start: 2018-09-23 | End: 2018-09-23

## 2018-09-23 RX ORDER — HYDROMORPHONE HYDROCHLORIDE 1 MG/ML
0.5 INJECTION, SOLUTION INTRAMUSCULAR; INTRAVENOUS; SUBCUTANEOUS EVERY 2 HOUR PRN
Status: DISCONTINUED | OUTPATIENT
Start: 2018-09-23 | End: 2018-09-23

## 2018-09-23 RX ORDER — ONDANSETRON 2 MG/ML
4 INJECTION INTRAMUSCULAR; INTRAVENOUS ONCE AS NEEDED
Status: DISCONTINUED | OUTPATIENT
Start: 2018-09-23 | End: 2018-09-23 | Stop reason: HOSPADM

## 2018-09-23 RX ORDER — DEXTROSE, SODIUM CHLORIDE, AND POTASSIUM CHLORIDE 5; .45; .15 G/100ML; G/100ML; G/100ML
INJECTION INTRAVENOUS CONTINUOUS
Status: DISCONTINUED | OUTPATIENT
Start: 2018-09-23 | End: 2018-10-01

## 2018-09-23 RX ORDER — MIDAZOLAM HYDROCHLORIDE 1 MG/ML
INJECTION INTRAMUSCULAR; INTRAVENOUS AS NEEDED
Status: DISCONTINUED | OUTPATIENT
Start: 2018-09-23 | End: 2018-09-23 | Stop reason: SURG

## 2018-09-23 RX ORDER — NEOSTIGMINE METHYLSULFATE 0.5 MG/ML
INJECTION INTRAVENOUS AS NEEDED
Status: DISCONTINUED | OUTPATIENT
Start: 2018-09-23 | End: 2018-09-23 | Stop reason: SURG

## 2018-09-23 RX ORDER — SODIUM CHLORIDE 9 MG/ML
INJECTION, SOLUTION INTRAVENOUS
Status: COMPLETED
Start: 2018-09-23 | End: 2018-09-23

## 2018-09-23 RX ORDER — LORAZEPAM 2 MG/ML
1 INJECTION INTRAMUSCULAR EVERY 6 HOURS PRN
Status: DISCONTINUED | OUTPATIENT
Start: 2018-09-23 | End: 2018-09-23

## 2018-09-23 RX ORDER — SODIUM CHLORIDE, SODIUM LACTATE, POTASSIUM CHLORIDE, CALCIUM CHLORIDE 600; 310; 30; 20 MG/100ML; MG/100ML; MG/100ML; MG/100ML
INJECTION, SOLUTION INTRAVENOUS CONTINUOUS
Status: DISCONTINUED | OUTPATIENT
Start: 2018-09-23 | End: 2018-09-23 | Stop reason: HOSPADM

## 2018-09-23 RX ORDER — ONDANSETRON 2 MG/ML
4 INJECTION INTRAMUSCULAR; INTRAVENOUS ONCE
Status: COMPLETED | OUTPATIENT
Start: 2018-09-23 | End: 2018-09-23

## 2018-09-23 RX ORDER — MORPHINE SULFATE 4 MG/ML
4 INJECTION, SOLUTION INTRAMUSCULAR; INTRAVENOUS EVERY 2 HOUR PRN
Status: DISCONTINUED | OUTPATIENT
Start: 2018-09-23 | End: 2018-09-23

## 2018-09-23 RX ORDER — MORPHINE SULFATE 4 MG/ML
2 INJECTION, SOLUTION INTRAMUSCULAR; INTRAVENOUS ONCE
Status: COMPLETED | OUTPATIENT
Start: 2018-09-23 | End: 2018-09-23

## 2018-09-23 RX ORDER — FAMOTIDINE 10 MG/ML
20 INJECTION, SOLUTION INTRAVENOUS EVERY 12 HOURS
Status: DISCONTINUED | OUTPATIENT
Start: 2018-09-23 | End: 2018-10-11

## 2018-09-23 RX ORDER — ENOXAPARIN SODIUM 100 MG/ML
40 INJECTION SUBCUTANEOUS DAILY
Status: DISCONTINUED | OUTPATIENT
Start: 2018-09-23 | End: 2018-10-06

## 2018-09-23 RX ORDER — ONDANSETRON 2 MG/ML
8 INJECTION INTRAMUSCULAR; INTRAVENOUS EVERY 8 HOURS PRN
Status: DISCONTINUED | OUTPATIENT
Start: 2018-09-23 | End: 2018-09-27

## 2018-09-23 RX ORDER — POTASSIUM CHLORIDE 14.9 MG/ML
20 INJECTION INTRAVENOUS ONCE
Status: COMPLETED | OUTPATIENT
Start: 2018-09-23 | End: 2018-09-23

## 2018-09-23 RX ORDER — DEXTROSE AND SODIUM CHLORIDE 5; .45 G/100ML; G/100ML
INJECTION, SOLUTION INTRAVENOUS CONTINUOUS
Status: ACTIVE | OUTPATIENT
Start: 2018-09-23 | End: 2018-09-23

## 2018-09-23 RX ORDER — BUPIVACAINE HYDROCHLORIDE AND EPINEPHRINE 2.5; 5 MG/ML; UG/ML
INJECTION, SOLUTION INFILTRATION; PERINEURAL AS NEEDED
Status: DISCONTINUED | OUTPATIENT
Start: 2018-09-23 | End: 2018-09-23 | Stop reason: HOSPADM

## 2018-09-23 RX ORDER — MORPHINE SULFATE 4 MG/ML
INJECTION, SOLUTION INTRAMUSCULAR; INTRAVENOUS
Status: DISPENSED
Start: 2018-09-23 | End: 2018-09-23

## 2018-09-23 RX ORDER — SODIUM CHLORIDE AND POTASSIUM CHLORIDE .9; .15 G/100ML; G/100ML
SOLUTION INTRAVENOUS CONTINUOUS
Status: DISCONTINUED | OUTPATIENT
Start: 2018-09-23 | End: 2018-09-23

## 2018-09-23 RX ORDER — MORPHINE SULFATE 4 MG/ML
4 INJECTION, SOLUTION INTRAMUSCULAR; INTRAVENOUS EVERY 10 MIN PRN
Status: DISCONTINUED | OUTPATIENT
Start: 2018-09-23 | End: 2018-09-23 | Stop reason: HOSPADM

## 2018-09-23 RX ORDER — HALOPERIDOL 5 MG/ML
0.25 INJECTION INTRAMUSCULAR ONCE AS NEEDED
Status: DISCONTINUED | OUTPATIENT
Start: 2018-09-23 | End: 2018-09-23 | Stop reason: HOSPADM

## 2018-09-23 RX ORDER — MORPHINE SULFATE 4 MG/ML
2 INJECTION, SOLUTION INTRAMUSCULAR; INTRAVENOUS EVERY 10 MIN PRN
Status: DISCONTINUED | OUTPATIENT
Start: 2018-09-23 | End: 2018-09-23 | Stop reason: HOSPADM

## 2018-09-23 RX ORDER — HYDROCODONE BITARTRATE AND ACETAMINOPHEN 5; 325 MG/1; MG/1
1 TABLET ORAL AS NEEDED
Status: DISCONTINUED | OUTPATIENT
Start: 2018-09-23 | End: 2018-09-23 | Stop reason: HOSPADM

## 2018-09-23 RX ORDER — ACETAMINOPHEN 10 MG/ML
1000 INJECTION, SOLUTION INTRAVENOUS EVERY 6 HOURS
Status: DISCONTINUED | OUTPATIENT
Start: 2018-09-23 | End: 2018-10-10

## 2018-09-23 RX ORDER — HYDROMORPHONE HYDROCHLORIDE 1 MG/ML
1 INJECTION, SOLUTION INTRAMUSCULAR; INTRAVENOUS; SUBCUTANEOUS EVERY 2 HOUR PRN
Status: DISCONTINUED | OUTPATIENT
Start: 2018-09-23 | End: 2018-09-23

## 2018-09-23 RX ORDER — SODIUM CHLORIDE, SODIUM LACTATE, POTASSIUM CHLORIDE, CALCIUM CHLORIDE 600; 310; 30; 20 MG/100ML; MG/100ML; MG/100ML; MG/100ML
INJECTION, SOLUTION INTRAVENOUS CONTINUOUS PRN
Status: DISCONTINUED | OUTPATIENT
Start: 2018-09-23 | End: 2018-09-23 | Stop reason: SURG

## 2018-09-23 RX ORDER — NALOXONE HYDROCHLORIDE 0.4 MG/ML
80 INJECTION, SOLUTION INTRAMUSCULAR; INTRAVENOUS; SUBCUTANEOUS AS NEEDED
Status: DISCONTINUED | OUTPATIENT
Start: 2018-09-23 | End: 2018-09-23 | Stop reason: HOSPADM

## 2018-09-23 RX ORDER — ACETAMINOPHEN 10 MG/ML
INJECTION, SOLUTION INTRAVENOUS AS NEEDED
Status: DISCONTINUED | OUTPATIENT
Start: 2018-09-23 | End: 2018-09-23 | Stop reason: SURG

## 2018-09-23 RX ORDER — HEPARIN SODIUM 1000 [USP'U]/ML
INJECTION, SOLUTION INTRAVENOUS; SUBCUTANEOUS AS NEEDED
Status: DISCONTINUED | OUTPATIENT
Start: 2018-09-23 | End: 2018-09-23 | Stop reason: HOSPADM

## 2018-09-23 RX ORDER — MORPHINE SULFATE 2 MG/ML
2 INJECTION, SOLUTION INTRAMUSCULAR; INTRAVENOUS EVERY 2 HOUR PRN
Status: DISCONTINUED | OUTPATIENT
Start: 2018-09-23 | End: 2018-09-23

## 2018-09-23 RX ORDER — MORPHINE SULFATE 10 MG/ML
6 INJECTION, SOLUTION INTRAMUSCULAR; INTRAVENOUS EVERY 10 MIN PRN
Status: DISCONTINUED | OUTPATIENT
Start: 2018-09-23 | End: 2018-09-23 | Stop reason: HOSPADM

## 2018-09-23 RX ORDER — SODIUM CHLORIDE 9 MG/ML
1000 INJECTION, SOLUTION INTRAVENOUS ONCE
Status: DISCONTINUED | OUTPATIENT
Start: 2018-09-23 | End: 2018-09-23

## 2018-09-23 RX ORDER — MORPHINE SULFATE 4 MG/ML
4 INJECTION, SOLUTION INTRAMUSCULAR; INTRAVENOUS ONCE
Status: COMPLETED | OUTPATIENT
Start: 2018-09-23 | End: 2018-09-23

## 2018-09-23 RX ORDER — ONDANSETRON 2 MG/ML
4 INJECTION INTRAMUSCULAR; INTRAVENOUS EVERY 4 HOURS PRN
Status: DISCONTINUED | OUTPATIENT
Start: 2018-09-23 | End: 2018-09-23

## 2018-09-23 RX ADMIN — MIDAZOLAM HYDROCHLORIDE 2 MG: 1 INJECTION INTRAMUSCULAR; INTRAVENOUS at 10:14:00

## 2018-09-23 RX ADMIN — SODIUM CHLORIDE, SODIUM LACTATE, POTASSIUM CHLORIDE, CALCIUM CHLORIDE: 600; 310; 30; 20 INJECTION, SOLUTION INTRAVENOUS at 11:25:00

## 2018-09-23 RX ADMIN — DEXAMETHASONE SODIUM PHOSPHATE 4 MG: 4 MG/ML VIAL (ML) INJECTION at 10:14:00

## 2018-09-23 RX ADMIN — SODIUM CHLORIDE, SODIUM LACTATE, POTASSIUM CHLORIDE, CALCIUM CHLORIDE: 600; 310; 30; 20 INJECTION, SOLUTION INTRAVENOUS at 10:05:00

## 2018-09-23 RX ADMIN — NEOSTIGMINE METHYLSULFATE 3 MG: 0.5 INJECTION INTRAVENOUS at 11:05:00

## 2018-09-23 RX ADMIN — ONDANSETRON 4 MG: 2 INJECTION INTRAMUSCULAR; INTRAVENOUS at 10:14:00

## 2018-09-23 RX ADMIN — GLYCOPYRROLATE 0.6 MG: 0.2 INJECTION INTRAMUSCULAR; INTRAVENOUS at 11:05:00

## 2018-09-23 RX ADMIN — ACETAMINOPHEN 1000 MG: 10 INJECTION, SOLUTION INTRAVENOUS at 11:23:00

## 2018-09-23 NOTE — H&P
[facility] [facility] Patient Status:  Inpatient    10/18/1960 MRN [id number]   Location [facility] 4W/SW/SE Attending Erika Kinney MD   Hosp Day # 0 PCP Fiona Duran MD     Date:  2018  D UTI (urinary tract infection)  Past Surgical History:  11/14/2011: BREAST BIOPSY;  Right      Comment:  stereotactically guided vacuum assisted core needle bx R               breast  4/23/2012: LAMINECTOMY      Comment:  Lt L5-S1 laminoforaminectomy & decom Abdominal pain  Genitourinary:  Negative  Endocrine:  Negative. Immunologic:  Negative. Musculoskeletal:  Negative. Integumentary:  Negative. Neurologic:  Negative. Psychiatric:  Negative.   ROS reviewed as documented in chart    Physical Exam:  Temp: piggyback every 8 hours, surgery has been consulted. Patient will remain n.p.o. for now, use morphine as needed for pain. IV fluids to be continued. Hypokalemia  We will supplement with IV fluids, initiate electrolyte replacement protocol.     Prophyla

## 2018-09-23 NOTE — BRIEF OP NOTE
Pre-Operative Diagnosis: perforated appendicitis with abscess     Post-Operative Diagnosis: perforated appendicitis   with abscess     Procedure Performed:   Procedure(s):  LAPAROSCOPIC APPENDECTOMY with drainage of abscess    Surgeon(s) and Role:     * Ma

## 2018-09-23 NOTE — CONSULTS
[facility]    Report of Consultation    Dionisio Levy Patient Status:  Inpatient    10/18/1960 MRN [id number]   Location [facility] PRE OP RECOVERY Attending Mohan Buitrago MD   Hosp Day # 0 PCP Orlando Turner MD     Date o nerve root  No date: MARICHUY BIOPSY STEREOTACTIC NODULE 1 SITE RIGHT  No date:       Comment:  x3  1985: OTHER      Comment:  diagnostic laparoscopy for infertility  2012: OTHER SURGICAL HISTORY      Comment:  interlaminar epidural steroid injection height 67\", weight 144 lb 4 oz (65.4 kg), SpO2 94 %, not currently breastfeeding.     General appearance:  alert, appears stated age, cooperative and moderate distress  Eyes: Sclera anicteric  Neck: no JVD and supple, symmetrical, trachea midline  Back: sy Approved by (CST): Samanta Davila MD on 9/23/2018 at 6:57                  Impression and Plan:  Patient Active Problem List:     Degeneration of lumbar or lumbosacral intervertebral disc     Acute appendicitis with localized peritonitis    Th

## 2018-09-23 NOTE — ANESTHESIA POSTPROCEDURE EVALUATION
Patient:  Jessi Alfarosurendraoziel    Procedure Summary     Date:  09/23/18 Room / Location:  [address] [place] [address] MAIN OR 03 / [address] [facility] MAIN OR    Anesthesia Start:  1005 Anesthesia Stop:      Procedure:  LAPAROSCOPIC APPENDECTOMY (N/A Abdomen) Diagnosis:       Acute appendicitis      (

## 2018-09-23 NOTE — ANESTHESIA PREPROCEDURE EVALUATION
Anesthesia PreOp Note    HPI:     Felipa Johnson is a 62year old female who presents for preoperative consultation requested by: Laura Abreu MD    Date of Surgery: 9/23/2018    Procedure(s):  LAPAROSCOPIC APPENDECTOMY  Indication: Acute appendici infertility  2/7/2012: OTHER SURGICAL HISTORY      Comment:  interlaminar epidural steroid injection L4-L5      Medications Prior to Admission:  HYDROcodone-acetaminophen  MG Oral Tab Take 1 tablet by mouth every 6 (six) hours as needed for Pain.  Dis Intravenous Continuous Declan Vaughn MD Last Rate: 125 mL/hr at 09/23/18 0600   [MAR Hold] LORazepam (ATIVAN) injection 1 mg 1 mg Intravenous Q6H PRN Declan Vaughn MD    [facility] Hold] HYDROmorphone HCl (DILAUDID) 1 MG/ML injection 0.5 mg 0.5 mg Leesa Posey Self-Exams: Not Asked    Social History Narrative      The patient does not use an assistive device. .        The patient does live in a home with stairs. Available pre-op labs reviewed.   Lab Results   Component Value Date    WBC 11.7 (H) 09/23/2018 and/or legal guardian or family member of the nature of the anesthetic plan, benefits, risks including possible dental damage if relevant, major complications, and any alternative forms of anesthetic management.    All of the patient's questions were answer

## 2018-09-23 NOTE — ED PROVIDER NOTES
Patient Seen in: [facility] AND [facility]    History   Patient presents with:  Abdomen/Flank Pain (GI/)    Stated Complaint: abd pain,     HPI    61 yo F without PMH presenting for evaluation of one day of initially periumbilical pain now w Tab,  Take 1 tablet by mouth every 6 (six) hours as needed for Pain. ClonazePAM 2 MG Oral Tab,  Take 1 tablet (2 mg total) by mouth 2 (two) times daily as needed for Anxiety.    TraMADol HCl (ULTRAM) 50 MG Oral Tab,  Take 1 tablet (50 mg total) by mouth a is warm. Psychiatric: Cooperative. Nursing note and vitals reviewed.         ED Course     Labs Reviewed   BASIC METABOLIC PANEL (8) - Abnormal; Notable for the following components:       Result Value    Glucose 136 (*)     Potassium 3.2 (*)     BUN 7 ( cardiomediastinal silhouette and bones are unremarkable. No pneumothorax or pleural effusion. Case faxed at 2:31 AM central time . If there are any questions please contact me at [phone], extension [phone]. Harvey Sandoval M.D.   This report has information and is intended solely for the use of the individual or entity to which it is addressed.  If you are not the intended recipient of this report, you are hereby notified that any copying, distribution, dissemination or action taken in relation to

## 2018-09-23 NOTE — SEPSIS REASSESSMENT
[facility]    Sepsis Reassessment Note    /73   Pulse 88   Temp 97.9 °F (36.6 °C) (Oral)   Ht 170.2 cm (5' 7\")   Wt 59 kg   SpO2 96%   BMI 20.36 kg/m²      3:43 AM    Cardiac:  Regularity: Regular  Rate: Normal  Heart Sounds: S1,S2

## 2018-09-24 PROCEDURE — 99233 SBSQ HOSP IP/OBS HIGH 50: CPT | Performed by: HOSPITALIST

## 2018-09-24 RX ORDER — 0.9 % SODIUM CHLORIDE 0.9 %
VIAL (ML) INJECTION
Status: COMPLETED
Start: 2018-09-24 | End: 2018-09-24

## 2018-09-24 RX ORDER — MAGNESIUM SULFATE HEPTAHYDRATE 40 MG/ML
2 INJECTION, SOLUTION INTRAVENOUS ONCE
Status: COMPLETED | OUTPATIENT
Start: 2018-09-24 | End: 2018-09-24

## 2018-09-24 NOTE — OPERATIVE REPORT
[facility]    PATIENT'S NAME: Ruby Helm   ATTENDING PHYSICIAN: Rodríguez Price MD   OPERATING PHYSICIAN: Amada Velazquez MD   PATIENT ACCOUNT#:   [de-identified]    LOCATION:  [address] [place] [address] #:   [id number]       DATE OF catheter. The abdomen was prepped and draped in sterile fashion. Small incision was made by the umbilicus, and a Veress needle was introduced into the abdominal cavity without difficulty.   Using saline drop test, placement was confirmed and pneumoperiton abdominal cavity was irrigated and aspirated with copious amounts of saline solution. All fibrinous exudate as well as fecaliths were removed. There were no further abnormalities present. The previous appendiceal abscess cavity was obliterated.   A 10 mm

## 2018-09-24 NOTE — PROGRESS NOTES
[facility]    Progress Note    Erickson Christa Patient Status:  Inpatient    10/18/1960 MRN [id number]   Location [facility] 4W/SW/SE Attending Mohan Reynolds MD   Hosp Day # 1 PCP Briseyda Villalobos MD       SUBJECTIVE:  Feeling Oral (er)    Result Date: 9/23/2018  CONCLUSION:  1. Acute appendicitis. 2. Findings were described by [facility].      Dictated by (CST): Scot Langston MD on 9/23/2018 at 6:52     Approved by (CST): Scot Langston MD on 9/23/2018 a

## 2018-09-24 NOTE — PROGRESS NOTES
[facility]    Progress Note    Marialuisa Liao Patient Status:  Inpatient    10/18/1960 MRN [id number]   Location [facility] 4W/SW/SE Attending Kobe Malone MD   Hosp Day # 1 PCP Kesha Becerra MD       Subjective:    Jorge Watkins Rush Davila MD on 9/23/2018 at 9:42     Approved by (CST): Ladan Davila MD on 9/23/2018 at 9:43          Ct Abdomen Pelvis Iv Contrast, No Oral (er)    Result Date: 9/23/2018  CONCLUSION:  1. Acute appendicitis.  2. Findings were desc

## 2018-09-25 PROCEDURE — 0T9B70Z DRAINAGE OF BLADDER WITH DRAINAGE DEVICE, VIA NATURAL OR ARTIFICIAL OPENING: ICD-10-PCS | Performed by: SURGERY

## 2018-09-25 PROCEDURE — 99233 SBSQ HOSP IP/OBS HIGH 50: CPT | Performed by: HOSPITALIST

## 2018-09-25 RX ORDER — FLUCONAZOLE 2 MG/ML
400 INJECTION, SOLUTION INTRAVENOUS ONCE
Status: COMPLETED | OUTPATIENT
Start: 2018-09-25 | End: 2018-09-25

## 2018-09-25 RX ORDER — FLUCONAZOLE 2 MG/ML
200 INJECTION, SOLUTION INTRAVENOUS EVERY 24 HOURS
Status: COMPLETED | OUTPATIENT
Start: 2018-09-26 | End: 2018-10-09

## 2018-09-25 NOTE — PLAN OF CARE
Patient removed NG tube at approximately [phone] stating she felt like it was coming out. Patient refusing NG tube replacement stating \"I want to wait and see what Dr. Chin Latham says today before we put it back\".

## 2018-09-25 NOTE — PROGRESS NOTES
[place] [facility]    Progress Note    Arlene Early Patient Status:  Inpatient    10/18/1960 MRN [id number]   Location [facility] 4W/SW/SE Attending Rbui Tanner MD   Hosp Day # 2 PCP Susan Luna MD       SUBJECTIVE:  NGT pul Ct Abdomen Pelvis Iv Contrast, No Oral (er)    Result Date: 9/23/2018  CONCLUSION:  1. Acute appendicitis. 2. Findings were described by [facility].      Dictated by (CST): Yann Ann MD on 9/23/2018 at 6:52     Approved by (CST): workup

## 2018-09-25 NOTE — PLAN OF CARE
GENITOURINARY - ADULT    • Absence of urinary retention Not Progressing          DISCHARGE PLANNING    • Discharge to home or other facility with appropriate resources Progressing        GASTROINTESTINAL - ADULT    • Minimal or absence of nausea and vomiti

## 2018-09-25 NOTE — PROGRESS NOTES
[facility]    Progress Note    Shobha Connelly Patient Status:  Inpatient    10/18/1960 MRN [id number]   Location [facility] 4W/SW/SE Attending Toño Hdez MD   [facility] Day # 2 PCP Anant Rios MD       Subjective:    Anthoney Boxer --   >60  >60   GFRNAA  >60   --   >60  >60   CA  9.4   --   8.3*  8.2*   NA  137   --   136  137   K  3.2*  4.2  4.0  3.9   CL  99   --   106  106   CO2  27   --   25  27                         Time spent in direct patient contact and decision making as

## 2018-09-26 PROCEDURE — 99233 SBSQ HOSP IP/OBS HIGH 50: CPT | Performed by: HOSPITALIST

## 2018-09-26 RX ORDER — MAGNESIUM SULFATE HEPTAHYDRATE 40 MG/ML
2 INJECTION, SOLUTION INTRAVENOUS ONCE
Status: COMPLETED | OUTPATIENT
Start: 2018-09-26 | End: 2018-09-26

## 2018-09-26 NOTE — PROGRESS NOTES
[place] [facility]    Progress Note    Jericho Cons Patient Status:  Inpatient    10/18/1960 MRN [id number]   Location [facility] 4W/SW/SE Attending Sabino Enriquez MD   Hosp Day # 3 PCP Jaspreet Matthews MD       SUBJECTIVE:  Not pas Piperacillin Sod-Tazobactam So (ZOSYN) 3.375 g in dextrose 5 % 100 mL ADD-vantage 3.375 g Intravenous Q8H   Normal Saline Flush 0.9 % injection 10 mL 10 mL Intravenous PRN   Enoxaparin Sodium (LOVENOX) 40 MG/0.4ML injection 40 mg 40 mg Subcutaneous Daily

## 2018-09-26 NOTE — PROGRESS NOTES
[facility]    Progress Note    Felipa Johnson Patient Status:  Inpatient    10/18/1960 MRN [id number]   Location [facility] 4W/SW/SE Attending Javid Sow MD   Hosp Day # 3 PCP Emerald Steel MD       Subjective:    Al Fairly 09/26/18   0540   GLU  118*  101*  115*   BUN  7*  4*  3*   CREATSERUM  0.79  0.74  0.74   GFRAA  >60  >60  >60   GFRNAA  >60  >60  >60   CA  8.3*  8.2*  8.4*   NA  136  137  137   K  4.0  3.9  4.2   CL  106  106  102   CO2  25  27  29

## 2018-09-27 ENCOUNTER — APPOINTMENT (OUTPATIENT)
Dept: GENERAL RADIOLOGY | Facility: HOSPITAL | Age: 58
DRG: 330 | End: 2018-09-27
Attending: SURGERY
Payer: COMMERCIAL

## 2018-09-27 PROCEDURE — 99233 SBSQ HOSP IP/OBS HIGH 50: CPT | Performed by: HOSPITALIST

## 2018-09-27 PROCEDURE — 74019 RADEX ABDOMEN 2 VIEWS: CPT | Performed by: SURGERY

## 2018-09-27 RX ORDER — METOCLOPRAMIDE HYDROCHLORIDE 5 MG/ML
INJECTION INTRAMUSCULAR; INTRAVENOUS
Status: COMPLETED
Start: 2018-09-27 | End: 2018-09-27

## 2018-09-27 RX ORDER — ESCITALOPRAM OXALATE 10 MG/1
20 TABLET ORAL DAILY
Status: DISCONTINUED | OUTPATIENT
Start: 2018-09-27 | End: 2018-10-11

## 2018-09-27 RX ORDER — ALFUZOSIN HYDROCHLORIDE 10 MG/1
10 TABLET, EXTENDED RELEASE ORAL
Status: DISCONTINUED | OUTPATIENT
Start: 2018-09-27 | End: 2018-10-11

## 2018-09-27 RX ORDER — 0.9 % SODIUM CHLORIDE 0.9 %
VIAL (ML) INJECTION
Status: COMPLETED
Start: 2018-09-27 | End: 2018-09-27

## 2018-09-27 RX ORDER — METOCLOPRAMIDE HYDROCHLORIDE 5 MG/ML
10 INJECTION INTRAMUSCULAR; INTRAVENOUS EVERY 6 HOURS
Status: COMPLETED | OUTPATIENT
Start: 2018-09-27 | End: 2018-09-30

## 2018-09-27 RX ORDER — SODIUM CHLORIDE 9 MG/ML
INJECTION, SOLUTION INTRAVENOUS
Status: COMPLETED
Start: 2018-09-27 | End: 2018-09-27

## 2018-09-27 RX ORDER — ONDANSETRON 2 MG/ML
8 INJECTION INTRAMUSCULAR; INTRAVENOUS EVERY 6 HOURS PRN
Status: DISCONTINUED | OUTPATIENT
Start: 2018-09-27 | End: 2018-10-06

## 2018-09-27 NOTE — PLAN OF CARE
SAFETY ADULT - FALL    • Free from fall injury Completed          GASTROINTESTINAL - ADULT    • Minimal or absence of nausea and vomiting Not Progressing    • Maintains or returns to baseline bowel function Not Progressing       Still w/ complaint of inter

## 2018-09-27 NOTE — PROGRESS NOTES
[facility]    Progress Note    Jericho Rowley Patient Status:  Inpatient    10/18/1960 MRN [id number]   Location [facility] 4W/SW/SE Attending Sabino Enriquez MD   Hosp Day # 4 PCP Japsreet Matthews MD       Subjective:    Early He 3*   CREATSERUM  0.74  0.74  0.89   GFRAA  >60  >60  >60   GFRNAA  >60  >60  >60   CA  8.2*  8.4*  8.6   NA  137  137  140   K  3.9  4.2  4.0   CL  106  102  103   CO2  27  29  30                         Time spent in direct patient contact and decision ma

## 2018-09-27 NOTE — PROGRESS NOTES
Patient voided at 130 pm 100 ml of julieta colored urine  She did not alert RN of urination until 1 hour after void. Bladder scan was performed and there was 100-120 ml in bladder. This information was passed along to Hector Akins.     Per Dr. Toi Aj

## 2018-09-27 NOTE — PROGRESS NOTES
[facility]    Progress Note    Izabella Bell Patient Status:  Inpatient    10/18/1960 MRN [id number]   Location [facility] 4W/SW/SE Attending Arzella Saint, MD   Hosp Day # 4 PCP Mela Chavez MD       SUBJECTIVE:  Modesto Otto Fluconazole in Sodium Chloride (DIFLUCAN) IVPB premix 200 mg 200 mg Intravenous Q24H   dextrose 5 % and 0.45 % NaCl with KCl 20 mEq infusion  Intravenous Continuous   famoTIDine (PEPCID) injection 20 mg 20 mg Intravenous Q12H   Piperacillin Sod-Tazobacta

## 2018-09-28 ENCOUNTER — APPOINTMENT (OUTPATIENT)
Dept: GENERAL RADIOLOGY | Facility: HOSPITAL | Age: 58
DRG: 330 | End: 2018-09-28
Attending: SURGERY
Payer: COMMERCIAL

## 2018-09-28 PROCEDURE — 74019 RADEX ABDOMEN 2 VIEWS: CPT | Performed by: SURGERY

## 2018-09-28 PROCEDURE — 0D9670Z DRAINAGE OF STOMACH WITH DRAINAGE DEVICE, VIA NATURAL OR ARTIFICIAL OPENING: ICD-10-PCS | Performed by: SURGERY

## 2018-09-28 PROCEDURE — 99233 SBSQ HOSP IP/OBS HIGH 50: CPT | Performed by: HOSPITALIST

## 2018-09-28 RX ORDER — SODIUM CHLORIDE 9 MG/ML
INJECTION, SOLUTION INTRAVENOUS
Status: COMPLETED
Start: 2018-09-28 | End: 2018-09-28

## 2018-09-28 NOTE — DIETARY NOTE
ADULT NUTRITION INITIAL ASSESSMENT    Pt is at moderate nutrition risk. Pt does not meet malnutrition criteria.       RECOMMENDATIONS TO MD:  See Nutrition Intervention   If GI status not improved within the next 48 hours, patient may benefit from TPN to accurate, 138 pounds at recent MD visit–using calculations. BMI: Body mass index is 22.59 kg/m².   BMI CLASSIFICATION: 19-24.9 kg/m2 - WNL  IBW: 135 Lbs        102 % IBW  Usual Body Wt: 394–594 Lbs       102 % UBW    WEIGHT HISTORY:  Patient Weight(s) for 21    NUTRITION PRESCRIPTION:  Diet: N.p.o.  Oral Supplements: None at this time  Estimated Nutritional Needs:  Calories: 1900 calories/day (30 calories per kg Current wt)  Protein: 92 grams protein/day (1.5 grams protein per kg Ideal body wt (IBW))      M

## 2018-09-28 NOTE — PAYOR COMM NOTE
REF: [id number]  APPROVED 9/23/18 - 9/28/18 REQUESTING ADDITIONAL DAYS      9/28/18  Subjective: Emerald Leo is a(n) 62year old   female without complaints     Assessment and Plan:    Emerald Leo is a(n) 62year old female    POD #5  Doing well, 8.6  8.4*   NA  137  140  140   K  4.2  4.0  3.6   CL  102  103  105   CO2  29  30  29       Xr Abdomen Obstructive Series Routine(2 Vw)(cpt=74019)     Result Date: 9/27/2018  CONCLUSION:   There are a few gas-distended loops of distal small bowel, and a

## 2018-09-28 NOTE — PROGRESS NOTES
[facility]    Progress Note    Arlene Early Patient Status:  Inpatient    10/18/1960 MRN [id number]   Location [facility] 4W/SW/SE Attending Rubi Tanner MD   Hosp Day # 5 PCP Susan Luna MD       SUBJECTIVE:  + nause Deep Marshall MD on 9/28/2018 at 10:41          Xr Abdomen Obstructive Series Routine(2 Vw)(cpt=74019)    Result Date: 9/27/2018  CONCLUSION:   There are a few gas-distended loops of distal small bowel, and a few air-fluid levels present on the decubit protocol     Hypophos  Replace    Anxiety/Depression  -cont lexapro    Prophylaxis  SCDs    CODE STATUS  Full     Primary care physician  Melba Triana MD     Disposition  Clinical course will dictate outcome         Greater than 35 minutes spent, >50% s

## 2018-09-28 NOTE — PROGRESS NOTES
[facility]    Progress Note    Laurenshruti Alvarado Patient Status:  Inpatient    10/18/1960 MRN [id number]   Location [facility] 4W/SW/SE Attending Sidney Gauthier MD   Hosp Day # 5 PCP Luis A Malhotra MD       Subjective:    Markus Lou 09/26/18   0540  09/27/18   0519  09/28/18   0552   GLU  115*  136*  145*   BUN  3*  3*  3*   CREATSERUM  0.74  0.89  0.70   GFRAA  >60  >60  >60   GFRNAA  >60  >60  >60   CA  8.4*  8.6  8.4*   NA  137  140  140   K  4.2  4.0  3.6   CL  102  103  105   CO

## 2018-09-28 NOTE — PLAN OF CARE
GASTROINTESTINAL - ADULT    • Minimal or absence of nausea and vomiting Not Progressing    • Maintains or returns to baseline bowel function Not Progressing          DISCHARGE PLANNING    • Discharge to home or other facility with appropriate resources Pro

## 2018-09-29 ENCOUNTER — APPOINTMENT (OUTPATIENT)
Dept: GENERAL RADIOLOGY | Facility: HOSPITAL | Age: 58
DRG: 330 | End: 2018-09-29
Attending: SURGERY
Payer: COMMERCIAL

## 2018-09-29 PROCEDURE — 74021 RADEX ABDOMEN 3+ VIEWS: CPT | Performed by: SURGERY

## 2018-09-29 PROCEDURE — 74018 RADEX ABDOMEN 1 VIEW: CPT | Performed by: SURGERY

## 2018-09-29 PROCEDURE — 99233 SBSQ HOSP IP/OBS HIGH 50: CPT | Performed by: HOSPITALIST

## 2018-09-29 PROCEDURE — 74250 X-RAY XM SM INT 1CNTRST STD: CPT | Performed by: SURGERY

## 2018-09-29 RX ORDER — METOCLOPRAMIDE HYDROCHLORIDE 5 MG/ML
10 INJECTION INTRAMUSCULAR; INTRAVENOUS EVERY 6 HOURS PRN
Status: DISCONTINUED | OUTPATIENT
Start: 2018-09-30 | End: 2018-09-29

## 2018-09-29 RX ORDER — MAGNESIUM SULFATE HEPTAHYDRATE 40 MG/ML
2 INJECTION, SOLUTION INTRAVENOUS ONCE
Status: COMPLETED | OUTPATIENT
Start: 2018-09-29 | End: 2018-09-29

## 2018-09-29 RX ORDER — POTASSIUM CHLORIDE 20 MEQ/1
40 TABLET, EXTENDED RELEASE ORAL EVERY 4 HOURS
Status: DISCONTINUED | OUTPATIENT
Start: 2018-09-29 | End: 2018-09-29

## 2018-09-29 RX ORDER — MAGNESIUM OXIDE 400 MG (241.3 MG MAGNESIUM) TABLET
400 TABLET ONCE
Status: DISCONTINUED | OUTPATIENT
Start: 2018-09-29 | End: 2018-09-29

## 2018-09-29 RX ORDER — METOCLOPRAMIDE HYDROCHLORIDE 5 MG/ML
10 INJECTION INTRAMUSCULAR; INTRAVENOUS EVERY 6 HOURS PRN
Status: DISCONTINUED | OUTPATIENT
Start: 2018-09-30 | End: 2018-10-06

## 2018-09-29 NOTE — PLAN OF CARE
DISCHARGE PLANNING    • Discharge to home or other facility with appropriate resources Progressing        GASTROINTESTINAL - ADULT    • Minimal or absence of nausea and vomiting Progressing    • Maintains or returns to baseline bowel function Progressing to LIS bile output. Patient ambulating but very weak. Patient scheduled for repeat Abd X ray this morning,

## 2018-09-29 NOTE — PROGRESS NOTES
[facility]    Progress Note    Erickson Christa Patient Status:  Inpatient    10/18/1960 MRN [id number]   Location [facility] 4W/SW/SE Attending Mohan Reynolds MD   Hosp Day # 6 PCP Briseyda Villalobos MD       Subjective:    Delia Gleason CREATSERUM  0.89  0.70  0.76   GFRAA  >60  >60  >60   GFRNAA  >60  >60  >60   CA  8.6  8.4*  8.2*   NA  140  140  136   K  4.0  3.6  3.4  3.4   CL  103  105  105   CO2  30  29  26             Xr Abdomen, Obstructive Series 3 Views(cpt=74021)    Result Da

## 2018-09-29 NOTE — PROGRESS NOTES
[place] [facility]    Progress Note    Billie Espinal Patient Status:  Inpatient    10/18/1960 MRN [id number]   Location [facility] 4W/SW/SE Attending Wolfgang Stephenson, [address] [facility] Day # 6 PCP Jose Luis Narayanan MD       Subjective:    Reji Alvarado mg 1,000 mg Intravenous Q6H   morphine 1mg/mL in NS 30 mL PCA syringe  Intravenous Continuous       Lab Results   Component Value Date    WBC 8.6 09/29/2018    HGB 12.6 09/29/2018    HCT 37.2 09/29/2018     09/29/2018    CREATSERUM 0.76 09/29/2018 CBC:    Lab Results   Component Value Date    WBC 8.6 09/29/2018    WBC 7.7 09/28/2018    WBC 5.0 09/27/2018     Lab Results   Component Value Date    HGB 12.6 09/29/2018    HGB 13.4 09/28/2018    HGB 14.2 09/27/2018    Lab Results   Component Value Da

## 2018-09-29 NOTE — PLAN OF CARE
Problem: Patient Centered Care  Goal: Patient preferences are identified and integrated in the patient's plan of care  Interventions:  - What would you like us to know as we care for you?  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

## 2018-09-30 ENCOUNTER — APPOINTMENT (OUTPATIENT)
Dept: GENERAL RADIOLOGY | Facility: HOSPITAL | Age: 58
DRG: 330 | End: 2018-09-30
Attending: SURGERY
Payer: COMMERCIAL

## 2018-09-30 PROCEDURE — 74021 RADEX ABDOMEN 3+ VIEWS: CPT | Performed by: SURGERY

## 2018-09-30 PROCEDURE — 99232 SBSQ HOSP IP/OBS MODERATE 35: CPT | Performed by: HOSPITALIST

## 2018-09-30 NOTE — PROGRESS NOTES
[facility]    Progress Note    Lulú Lacy Patient Status:  Inpatient    10/18/1960 MRN [id number]   Location [facility] 4W/SW/SE Attending Madina Hummel MD   Hosp Day # 7 PCP May Medeiros MD       Subjective:    Young Krause for: PT, INR    Recent Labs   Lab  09/28/18   0552  09/29/18   0510  09/30/18   0541   GLU  145*  123*  137*   BUN  3*  3*  3*   CREATSERUM  0.70  0.76  0.74   GFRAA  >60  >60  >60   GFRNAA  >60  >60  >60   CA  8.4*  8.2*  8.5   NA  140  136  137   K  3.6 jejunum is dilated and there is slow transit of contrast material through the jejunum. It does not reach the distal jejunum at time of imaging and there is a dilated fluid-filled or gas-filled loop of jejunum in the left lower quadrant.   The appearance otero

## 2018-09-30 NOTE — PROGRESS NOTES
[place] [facility]    Progress Note    Izabella Bell Patient Status:  Inpatient    10/18/1960 MRN [id number]   Location [facility] 4W/SW/SE Attending Michelle Meng, [address] [facility] Day # 7 PCP Mela Chavez MD       Subjective:    Lori [place] Intravenous Continuous       Lab Results   Component Value Date    WBC 8.6 09/30/2018    HGB 13.0 09/30/2018    HCT 38.2 09/30/2018     09/30/2018    CREATSERUM 0.74 09/30/2018    BUN 3 (L) 09/30/2018     09/30/2018    K 3.7 09/30/2018    CL 1 upper abdomen. Exam was performed ovaries 7 hr time frame.   A small but persistent quantity of contrast material opacifies the gastric fundus throughout this exam.  Contrast material is also seen within the duodenum throughout this exam.  The jejunum is d with evidence of sbo.  NGT reinserted   Pain controlled currently  IVFs  Will have a slow recovery likely     Hypokalemia/Hypomag  Replace per protocol     Hypophos  Replace     Anxiety/Depression  -cont lexapro     Prophylaxis  SCDs     CODE STATUS  Full

## 2018-10-01 ENCOUNTER — APPOINTMENT (OUTPATIENT)
Dept: PICC SERVICES | Facility: HOSPITAL | Age: 58
DRG: 330 | End: 2018-10-01
Attending: SURGERY
Payer: COMMERCIAL

## 2018-10-01 PROCEDURE — 99233 SBSQ HOSP IP/OBS HIGH 50: CPT | Performed by: HOSPITALIST

## 2018-10-01 PROCEDURE — 3E0436Z INTRODUCTION OF NUTRITIONAL SUBSTANCE INTO CENTRAL VEIN, PERCUTANEOUS APPROACH: ICD-10-PCS | Performed by: HOSPITALIST

## 2018-10-01 PROCEDURE — 02HV33Z INSERTION OF INFUSION DEVICE INTO SUPERIOR VENA CAVA, PERCUTANEOUS APPROACH: ICD-10-PCS | Performed by: HOSPITALIST

## 2018-10-01 RX ORDER — LIDOCAINE HYDROCHLORIDE 10 MG/ML
0.5 INJECTION, SOLUTION INFILTRATION; PERINEURAL ONCE AS NEEDED
Status: ACTIVE | OUTPATIENT
Start: 2018-10-01 | End: 2018-10-01

## 2018-10-01 RX ORDER — POTASSIUM CHLORIDE 14.9 MG/ML
20 INJECTION INTRAVENOUS ONCE
Status: COMPLETED | OUTPATIENT
Start: 2018-10-01 | End: 2018-10-01

## 2018-10-01 RX ORDER — ALPRAZOLAM 0.25 MG/1
0.25 TABLET ORAL NIGHTLY PRN
Status: DISCONTINUED | OUTPATIENT
Start: 2018-10-01 | End: 2018-10-11

## 2018-10-01 RX ORDER — MAGNESIUM SULFATE HEPTAHYDRATE 40 MG/ML
2 INJECTION, SOLUTION INTRAVENOUS ONCE
Status: COMPLETED | OUTPATIENT
Start: 2018-10-01 | End: 2018-10-01

## 2018-10-01 RX ORDER — SODIUM CHLORIDE 9 MG/ML
INJECTION, SOLUTION INTRAVENOUS CONTINUOUS
Status: DISCONTINUED | OUTPATIENT
Start: 2018-10-01 | End: 2018-10-11

## 2018-10-01 RX ORDER — SODIUM CHLORIDE 0.9 % (FLUSH) 0.9 %
10 SYRINGE (ML) INJECTION AS NEEDED
Status: DISCONTINUED | OUTPATIENT
Start: 2018-10-01 | End: 2018-10-06

## 2018-10-01 NOTE — PROGRESS NOTES
[facility]    Progress Note    Katia Yu Patient Status:  Inpatient    10/18/1960 MRN [id number]   Location [facility] 4W/SW/SE Attending Seferino Noble MD   [facility] Day # 8 PCP Ave Steel MD       Subjective:    Lynsey Jacome 0508   RBC  4.02  4.16  4.04   HGB  12.6  13.0  12.5   HCT  37.2  38.2  37.5   MCV  92.3  91.8  92.7   MCH  31.2  31.2  31.0   MCHC  33.8  34.0  33.4   RDW  12.4  12.6  12.8   WBC  8.6  8.6  8.8   PLT  351  383  448*     No results found for: PT, INR    Re loop of jejunum in the left lower quadrant. The appearance suggests there is a partial small-bowel obstruction or ileus. Exam discussed with Dr. Kirill Adam on 9/29/18 at 7:00 p.m.  We discussed that the patient had a bowel movement during this exam and als

## 2018-10-01 NOTE — DIETARY NOTE
ADULT NUTRITION UPDATE NOTE    Pt is NPO x8 days with NGT to LIS continuing. MD order to start TPN. PICC line placed, TPN ordered to start at 9pm tonight.     TPN as follows ordered for tonight: 2L, 90 g protein, 1000 non protein calories (600 dextrose/4 CA  8.2*  8.5  8.6   MG  1.6*  2.0  1.8   NA  136  137  139   K  3.4  3.4  3.7  3.7   CL  105  102  105   CO2  26  25  29   PHOS  2.9  3.2  4.3   OSMOCALC  280  283  286     NUTRITION PRESCRIPTION:  Estimated Nutritional Needs:  Calories: 1900 calories/d

## 2018-10-01 NOTE — PROGRESS NOTES
Vascular Access Note  Inserted by Jeison Sunshine Rn  Vascular Access Screening:   Allergies to Lidocaine: no  Allergies to Latex: no  Presence of Pacemaker/Defibrillator: No  Mastectomy with Lymph Node Dissection: No  AV Fistula / AV Graft: No  Dialysis Catheter:

## 2018-10-01 NOTE — PROGRESS NOTES
[place] [facility]    Progress Note    Erich Armando Patient Status:  Inpatient    10/18/1960 MRN [id number]   Location [facility] 4W/SW/SE Attending Pete Fletcher, [address] [facility] Day # 8 PCP Cornelia Crawford MD       Subjective:    Jessi Treadwell 3.375 g Intravenous Q8H   Normal Saline Flush 0.9 % injection 10 mL 10 mL Intravenous PRN   Enoxaparin Sodium (LOVENOX) 40 MG/0.4ML injection 40 mg 40 mg Subcutaneous Daily   acetaminophen (OFIRMEV) infusion 1,000 mg 1,000 mg Intravenous Q6H   morphine 1mg throughout this exam.  Contrast material is also seen within the duodenum throughout this exam.  The jejunum is dilated and there is slow transit of contrast material through the jejunum.   It does not reach the distal jejunum at time of imaging and there i    Hypokalemia/Hypomag  Replace per protocol     Hypophos  Replace     Anxiety/Depression  -cont lexapro  - order xanax nightly prn for sleep      Prophylaxis  SCDs     CODE STATUS  Full     Primary care physician  MD Gabriela Palafox

## 2018-10-02 ENCOUNTER — APPOINTMENT (OUTPATIENT)
Dept: GENERAL RADIOLOGY | Facility: HOSPITAL | Age: 58
DRG: 330 | End: 2018-10-02
Attending: SURGERY
Payer: COMMERCIAL

## 2018-10-02 ENCOUNTER — APPOINTMENT (OUTPATIENT)
Dept: CT IMAGING | Facility: HOSPITAL | Age: 58
DRG: 330 | End: 2018-10-02
Attending: SURGERY
Payer: COMMERCIAL

## 2018-10-02 PROCEDURE — 74021 RADEX ABDOMEN 3+ VIEWS: CPT | Performed by: SURGERY

## 2018-10-02 PROCEDURE — 99233 SBSQ HOSP IP/OBS HIGH 50: CPT | Performed by: HOSPITALIST

## 2018-10-02 PROCEDURE — 74177 CT ABD & PELVIS W/CONTRAST: CPT | Performed by: SURGERY

## 2018-10-02 RX ORDER — METOCLOPRAMIDE HYDROCHLORIDE 5 MG/ML
10 INJECTION INTRAMUSCULAR; INTRAVENOUS ONCE
Status: COMPLETED | OUTPATIENT
Start: 2018-10-02 | End: 2018-10-02

## 2018-10-02 NOTE — PLAN OF CARE
Patient/Family Goals    • Patient/Family Short Term Goal Not Progressing          DISCHARGE PLANNING    • Discharge to home or other facility with appropriate resources Progressing        GASTROINTESTINAL - ADULT    • Minimal or absence of nausea and vomit

## 2018-10-02 NOTE — PROGRESS NOTES
[place] [facility] - [facility]    Progress Note    Jericho Rowley Patient Status:  Inpatient    10/18/1960 MRN [id number]   Location [facility] 4W/SW/SE Attending Rocio Mccormick, [address] [facility] Day # 9 PCP Jaspreet Matthews MD       Subjective:    Davina Garcia injection 10 mL 10 mL Intravenous PRN   Enoxaparin Sodium (LOVENOX) 40 MG/0.4ML injection 40 mg 40 mg Subcutaneous Daily   acetaminophen (OFIRMEV) infusion 1,000 mg 1,000 mg Intravenous Q6H   morphine 1mg/mL in NS 30 mL PCA syringe  Intravenous Continuous 0601   GLU  137*  124*  131*  131*   BUN  3*  2*  7*  7*   CREATSERUM  0.74  0.82  0.71  0.71   GFRAA  >60  >60  >60  >60   GFRNAA  >60  >60  >60  >60   CA  8.5  8.6  8.8  8.8   NA  137  139  135*  135*   K  3.7  3.7  4.0  4.0  4.0   CL  102  105  101  101

## 2018-10-02 NOTE — PROGRESS NOTES
[facility]    Progress Note    Chary Calero Patient Status:  Inpatient    10/18/1960 MRN [id number]   Location [facility] 4W/SW/SE Attending Willard Stevens MD   [address] Day # 9 PCP Juliette Ovalles MD       Subjective:    Kristel Wolff 09/30/18   0541  10/01/18   0508  10/02/18   0601   RBC  4.16  4.04  4.48   HGB  13.0  12.5  13.5   HCT  38.2  37.5  41.3   MCV  91.8  92.7  92.1   MCH  31.2  31.0  30.0   MCHC  34.0  33.4  32.6   RDW  12.6  12.8  12.4   WBC  8.6  8.8  17.4*   PLT  383  44

## 2018-10-03 ENCOUNTER — APPOINTMENT (OUTPATIENT)
Dept: GENERAL RADIOLOGY | Facility: HOSPITAL | Age: 58
DRG: 330 | End: 2018-10-03
Attending: SURGERY
Payer: COMMERCIAL

## 2018-10-03 PROCEDURE — 99233 SBSQ HOSP IP/OBS HIGH 50: CPT | Performed by: HOSPITALIST

## 2018-10-03 PROCEDURE — 74021 RADEX ABDOMEN 3+ VIEWS: CPT | Performed by: SURGERY

## 2018-10-03 RX ORDER — 0.9 % SODIUM CHLORIDE 0.9 %
VIAL (ML) INJECTION
Status: COMPLETED
Start: 2018-10-03 | End: 2018-10-03

## 2018-10-03 RX ORDER — MAGNESIUM CARB/ALUMINUM HYDROX 105-160MG
30 TABLET,CHEWABLE ORAL 2 TIMES DAILY
Status: DISCONTINUED | OUTPATIENT
Start: 2018-10-03 | End: 2018-10-06

## 2018-10-03 NOTE — PROGRESS NOTES
[facility]    Progress Note    Shobha Connelly Patient Status:  Inpatient    10/18/1960 MRN [id number]   Location [facility] 4W/SW/SE Attending De Gomez Day # 10 PCP Anant Rios MD        Subjective: about ng tube                     Results:     Lab Results   Component Value Date    WBC 13.9 (H) 10/03/2018    HGB 13.4 10/03/2018    HCT 39.9 10/03/2018     (H) 10/03/2018    CREATSERUM 0.71 10/03/2018    BUN 13 10/03/2018     10/03/2018 with cecectomy and drainage of abscess 9/23/2018. TECHNIQUE: CT images of the abdomen and pelvis were obtained with non-ionic intravenous contrast material.  Automated exposure control for dose reduction was used.  Adjustment of the mA and/or kV was done b Ill-defined fluid marginating the postsurgical bowel loops in the right lower quadrant is present, which could be reactive versus phlegmonous debris. No well-organized or rim-enhancing collections. 2. Small right pleural effusion.   3. Findings that could MD  10/3/2018

## 2018-10-03 NOTE — CM/SW NOTE
CTL progression of care note:  Seen per dietitian today. Remains NPO with NG in place. Patient will have a repeat obstructive series on Thursday. Ambulating.

## 2018-10-03 NOTE — PAYOR COMM NOTE
REF:  [id number]  - APPROVAL PER IEXCHANGE- 9/23/18 - 10/1/18 - REQUESTING ADDITIONAL DAYS    10/1/18  Subjective: Craig Blizzard is a(n) 62year old female Pt doing about the same. No bm.  Passing gas.      ROS:   No cp, sob   No c/d   No n/v      Objec MG/0.4ML injection 40 mg 40 mg Subcutaneous Daily   acetaminophen (OFIRMEV) infusion 1,000 mg 1,000 mg Intravenous Q6H   morphine 1mg/mL in NS 30 mL PCA syringe   Intravenous Continuous               Lab Results   Component Value Date     WBC 8.8 10/01/201 dilated fluid-filled or gas-filled loop of jejunum in the left lower quadrant. The appearance suggests there is a partial small-bowel obstruction or ileus. Exam discussed with Dr. Candace Louis on 9/29/18 at 7:00 p.m.  We discussed that the patient had a greg Saline Flush 0.9 % injection 10 mL 10 mL Intravenous PRN   dextrose 10 % infusion   Intravenous Continuous PRN   adult 3 in 1 TPN   Intravenous Continuous TPN   ALPRAZolam (XANAX) tab 0.25 mg 0.25 mg Oral Nightly PRN   0.9%  NaCl infusion   Intravenous Con 10/2/2018  CONCLUSION:  1. Persistent small bowel dilatation with distended jejunum. Differential includes partial small bowel structure versus small bowel ileus. NG tube in the stomach. 2. No pneumoperitoneum.  3. Surgical CORA drain in the right lower quadr source Oral, resp. rate 16, height 67\", weight 133 lb 6.4 oz (60.5 kg), SpO2 96 %, not currently breastfeeding. I/O last 3 completed shifts:   In: 3864.2 [I.V.:2086; NG/GT:590]  Out: 2030 [Urine:825; Emesis/NG output:1175; Drains:30]      General appearanc

## 2018-10-03 NOTE — PROGRESS NOTES
[facility]    Progress Note    Katia Yu Patient Status:  Inpatient    10/18/1960 MRN [id number]   Location [facility] 4W/SW/SE Attending Seferino Noble MD   [facility] Day # 10 PCP Ave Steel MD       Subjective:    Aleksey Wyatt RBC  4.04  4.48  4.39   HGB  12.5  13.5  13.4   HCT  37.5  41.3  39.9   MCV  92.7  92.1  90.9   MCH  31.0  30.0  30.5   MCHC  33.4  32.6  33.5   RDW  12.8  12.4  12.7   WBC  8.8  17.4*  13.9*   PLT  448*  462*  467*     No results found for: PT, INR    R patient's size. Use of iterative reconstruction technique for dose reduction was used. FINDINGS:  LIVER: No liver mass is identified. BILIARY: The gallbladder is present. No intra-or extrahepatic bile duct dilatation. SPLEEN: Normal size.   PANCREAS: No congestion syndrome in the appropriate clinical setting. Dictated by (CST): Melissa Lopez MD on 10/02/2018 at 13:17     Approved by (CST): Melissa Lopez MD on 10/02/2018 at 13:30     4.  ADDENDUM:  COMPARISON: [address] [place] [address]

## 2018-10-03 NOTE — CONSULTS
[facility] ID CONSULT NOTE    Kassie Romero Caitlinoziel Patient Status:  Inpatient    10/18/1960 MRN [id number]   Location [facility] 4W/SW/SE Attending De Gomez Bety Day # 10 PCP Emerald Steel MD       R [address]    x3   • PUD (peptic ulcer disease)    • s/p left L4-5 laminectomy with L5 sacralized 6/17/2014   • UTI (urinary tract infection) 2014     Past Surgical History:  No date: APPENDECTOMY  11/14/2011: BREAST BIOPSY;  Right      Comment:  stereotact (UROXATRAL) 24 hr tab 10 mg, 10 mg, Oral, Daily with breakfast  •  Fluconazole in Sodium Chloride (DIFLUCAN) IVPB premix 200 mg, 200 mg, Intravenous, Q24H  •  famoTIDine (PEPCID) injection 20 mg, 20 mg, Intravenous, Q12H  •  Piperacillin Sod-Tazobactam So drain with serous fluid  Musculoskeletal: No edema noted, no CVA tenderness  Integument: No lesions. No erythema. Lines: PICC+    Laboratory Data:  Recent Labs   Lab  10/03/18   0529   RBC  4.39   HGB  13.4   HCT  39.9   MCV  90.9   MCH  30.5   MCHC  33. 5 low back pain  # GERD    PLAN:  -  Continue on IV zosyn and fluconazole. Given PICC line, will obtain blood cx to RO bacteremia.  -  Follow fever curve, wbc. -  Reviewed labs, micro, imaging reports, available old records. -  Case d/w patient, RN.     Lissette Acevedo

## 2018-10-03 NOTE — CM/SW NOTE
SW self-referred to meet w/ pt due to case finding and diagnosis. SW met w/ pt to discuss eventual discharge needs. Pt lives at home w/ her supportive  in [place]. Pt lives a in a 2 level house w/ 6 stairs.  Pt is independent w/ all ADL's and does n

## 2018-10-03 NOTE — DIETARY NOTE
ADULT NUTRITION REASSESSMENT     Pt is at high nutrition risk. Pt meets malnutrition criteria.         CRITERIA FOR MALNUTRITION DIAGNOSIS:  Criteria for non-severe malnutrition diagnosis: acute illness/injury related to body fat mild depletion and muscl (gastroesophageal reflux disease), Infertility, Insomnia, L4-5 left mild foraminal bulging disc, L4-5 left mild foraminal stenosis, L4-5 small central HNP, L4-L5 disc bulge, Left low back pain, left S1 radiculopathy, Lower back pain, Measles, Menopause, Pa 7*  7*  13   CREATSERUM  0.82  0.71  0.71  0.71   CA  8.6  8.8  8.8  8.7   MG  1.8  2.1  2.2   NA  139  135*  135*  137   K  3.7  4.0  4.0  4.0  3.8   CL  105  101  101  102   CO2  29  26  26  28   PHOS  4.3  3.1  3.2   OSMOCALC  286  280  280  285

## 2018-10-04 ENCOUNTER — APPOINTMENT (OUTPATIENT)
Dept: GENERAL RADIOLOGY | Facility: HOSPITAL | Age: 58
DRG: 330 | End: 2018-10-04
Attending: SURGERY
Payer: COMMERCIAL

## 2018-10-04 PROCEDURE — 99233 SBSQ HOSP IP/OBS HIGH 50: CPT | Performed by: HOSPITALIST

## 2018-10-04 PROCEDURE — 74019 RADEX ABDOMEN 2 VIEWS: CPT | Performed by: SURGERY

## 2018-10-04 NOTE — PROGRESS NOTES
[facility]    Progress Note    Juan Alberto Santacruz Patient Status:  Inpatient    10/18/1960 MRN [id number]   Location [facility] 4W/SW/SE Attending Axel Howe MD   [facility] Day # 11 PCP Brina Henning MD       Subjective:    Polo Hernandez stated age and cooperative  Neck: no JVD and supple, symmetrical, trachea midline  Pulmonary: No labored breathing, equal respiratory excursion  Cardiovascular: regular rate and rhythm  Abdominal: soft, non-tender; bowel sounds normal; no masses,  no organ (ER), 9/23/2018, 2:42. INDICATIONS: Perforated appendicitis, persistent leukocytosis. Post laparoscopic appendectomy with cecectomy and drainage of abscess 9/23/2018.   TECHNIQUE: CT images of the abdomen and pelvis were obtained with non-ionic intravenous perienteric inflammatory stranding in the right lower quadrant involving the distal small bowel and proximal colon.  Ill-defined fluid marginating the postsurgical bowel loops in the right lower quadrant is present, which could be reactive versus phlegmonou Jonnie Coronel MD on 10/02/2018 at 13:30          Xr Abdomen Obstructive Series Routine(2 Vw)(cpt=74019)    Result Date: 10/4/2018  CONCLUSION:  1. Suspect persistent High-Grade Partial Small Bowel Obstruction with distended jejunum 2.  Nasogastric tube

## 2018-10-04 NOTE — PROGRESS NOTES
[place] [facility]    Progress Note    Ben Paul Patient Status:  Inpatient    10/18/1960 MRN [id number]   Location [facility] 4W/SW/SE Attending De Gomez Day # 11 PCP Cornelia Arnold MD        Subjective: care with nurse and counseling pt about ng tube/sbo             Results:     Lab Results   Component Value Date    WBC 11.1 (H) 10/04/2018    HGB 12.5 10/04/2018    HCT 37.2 10/04/2018     (H) 10/04/2018    CREATSERUM 0.67 10/04/2018    BUN 11 10/04

## 2018-10-04 NOTE — PROGRESS NOTES
[facility] ID PROGRESS NOTE    Katia Yu Patient Status:  Inpatient    10/18/1960 MRN [id number]   Location [facility] 4W/SW/SE Attending De Gomez Bety Day # 6 PCP Ave Steel MD     Subjective:  Awake, obstructive series appendectomy with cecectomy with drainage of abscess with CORA drain placement 9/23, cx with E. coli, Strep anginosus, C. albicans, bacteroides, fluconazole added.  Post-op course c/b ileus, NGT replaced 9/28, PICC placed 10/1 with TPN initiated, wbc up to 17

## 2018-10-05 ENCOUNTER — APPOINTMENT (OUTPATIENT)
Dept: GENERAL RADIOLOGY | Facility: HOSPITAL | Age: 58
DRG: 330 | End: 2018-10-05
Attending: SURGERY
Payer: COMMERCIAL

## 2018-10-05 PROCEDURE — 99233 SBSQ HOSP IP/OBS HIGH 50: CPT | Performed by: HOSPITALIST

## 2018-10-05 PROCEDURE — 74021 RADEX ABDOMEN 3+ VIEWS: CPT | Performed by: SURGERY

## 2018-10-05 PROCEDURE — 74250 X-RAY XM SM INT 1CNTRST STD: CPT | Performed by: SURGERY

## 2018-10-05 RX ORDER — SODIUM CHLORIDE 9 MG/ML
INJECTION, SOLUTION INTRAVENOUS
Status: COMPLETED
Start: 2018-10-05 | End: 2018-10-05

## 2018-10-05 NOTE — PROGRESS NOTES
[facility] ID PROGRESS NOTE    Marialuisa Liao Patient Status:  Inpatient    10/18/1960 MRN [id number]   Location [facility] 4W/SW/SE Attending De Gomezissa Day # 15 PCP Kesha Becerra MD     Subjective:  Awake, obstructive series possible abscess, started on zosyn, s/p lap appendectomy with cecectomy with drainage of abscess with CORA drain placement 9/23, cx with E. coli, Strep anginosus, C. albicans, bacteroides, fluconazole added.  Post-op course c/b ileus, NGT replaced 9/28, PICC

## 2018-10-05 NOTE — PROGRESS NOTES
[facility]    Progress Note    Izabella Bell Patient Status:  Inpatient    10/18/1960 MRN [id number]   Location [facility] 4W/SW/SE Attending Arzella Saint, MD   [facility] Day # 12 PCP Mela Chavez MD       Subjective:    Lori Gaston excursion  Cardiovascular: regular rate and rhythm  Abdominal: soft, non-tender; bowel sounds normal; no masses,  no organomegaly and CORA fluid serous, + BM + flatus  Extremities: extremities normal, atraumatic, no cyanosis or edema          Results:

## 2018-10-05 NOTE — PAYOR COMM NOTE
[id number] APPROVED 9/23/18 - 10/5/18 REQUESTING ADDITIONAL DAYS        10/5/18  Subjective: Craig Blizzard is a(n) 62year old   female without complaints     Assessment and Plan:    Craig Blizzard is a(n) 62year old female    POD #12  Doing well, flatus  Extremities: extremities normal, atraumatic, no cyanosis or edema     Recent Labs   Lab  10/03/18   0529  10/04/18   0509  10/05/18   0501   RBC  4.39  4.04  4.17   HGB  13.4  12.5  12.9   HCT  39.9  37.2  38.6   MCV  90.9  92.0  92.4   MCH  30.5

## 2018-10-05 NOTE — PROGRESS NOTES
[place] [facility]    Progress Note    Natalio Craig Patient Status:  Inpatient    10/18/1960 MRN [id number]   Location [facility] 4W/SW/SE Attending De Gomez Day # 12 PCP Amber Carlson MD        Subjective: tube/sbo  As well as her  in room about care                    Results:     Lab Results   Component Value Date    WBC 9.1 10/05/2018    HGB 12.9 10/05/2018    HCT 38.6 10/05/2018     (H) 10/05/2018    CREATSERUM 0.65 10/05/2018    BUN 12 10/

## 2018-10-06 ENCOUNTER — ANESTHESIA EVENT (OUTPATIENT)
Dept: SURGERY | Facility: HOSPITAL | Age: 58
DRG: 330 | End: 2018-10-06
Payer: COMMERCIAL

## 2018-10-06 ENCOUNTER — ANESTHESIA (OUTPATIENT)
Dept: SURGERY | Facility: HOSPITAL | Age: 58
DRG: 330 | End: 2018-10-06
Payer: COMMERCIAL

## 2018-10-06 ENCOUNTER — APPOINTMENT (OUTPATIENT)
Dept: GENERAL RADIOLOGY | Facility: HOSPITAL | Age: 58
DRG: 330 | End: 2018-10-06
Attending: SURGERY
Payer: COMMERCIAL

## 2018-10-06 PROCEDURE — 74018 RADEX ABDOMEN 1 VIEW: CPT | Performed by: SURGERY

## 2018-10-06 PROCEDURE — 0WQF0ZZ REPAIR ABDOMINAL WALL, OPEN APPROACH: ICD-10-PCS | Performed by: SURGERY

## 2018-10-06 PROCEDURE — 0WJF4ZZ INSPECTION OF ABDOMINAL WALL, PERCUTANEOUS ENDOSCOPIC APPROACH: ICD-10-PCS | Performed by: SURGERY

## 2018-10-06 PROCEDURE — 99232 SBSQ HOSP IP/OBS MODERATE 35: CPT | Performed by: HOSPITALIST

## 2018-10-06 RX ORDER — MORPHINE SULFATE 4 MG/ML
4 INJECTION, SOLUTION INTRAMUSCULAR; INTRAVENOUS EVERY 10 MIN PRN
Status: DISCONTINUED | OUTPATIENT
Start: 2018-10-06 | End: 2018-10-06 | Stop reason: HOSPADM

## 2018-10-06 RX ORDER — HEPARIN SODIUM 5000 [USP'U]/ML
5000 INJECTION, SOLUTION INTRAVENOUS; SUBCUTANEOUS EVERY 12 HOURS SCHEDULED
Status: DISCONTINUED | OUTPATIENT
Start: 2018-10-07 | End: 2018-10-11

## 2018-10-06 RX ORDER — ONDANSETRON 2 MG/ML
INJECTION INTRAMUSCULAR; INTRAVENOUS AS NEEDED
Status: DISCONTINUED | OUTPATIENT
Start: 2018-10-06 | End: 2018-10-06 | Stop reason: SURG

## 2018-10-06 RX ORDER — ROCURONIUM BROMIDE 10 MG/ML
INJECTION, SOLUTION INTRAVENOUS AS NEEDED
Status: DISCONTINUED | OUTPATIENT
Start: 2018-10-06 | End: 2018-10-06 | Stop reason: SURG

## 2018-10-06 RX ORDER — HYDROMORPHONE HYDROCHLORIDE 1 MG/ML
0.5 INJECTION, SOLUTION INTRAMUSCULAR; INTRAVENOUS; SUBCUTANEOUS EVERY 2 HOUR PRN
Status: COMPLETED | OUTPATIENT
Start: 2018-10-06 | End: 2018-10-06

## 2018-10-06 RX ORDER — MIDAZOLAM HYDROCHLORIDE 1 MG/ML
INJECTION INTRAMUSCULAR; INTRAVENOUS AS NEEDED
Status: DISCONTINUED | OUTPATIENT
Start: 2018-10-06 | End: 2018-10-06 | Stop reason: SURG

## 2018-10-06 RX ORDER — SODIUM CHLORIDE, SODIUM LACTATE, POTASSIUM CHLORIDE, CALCIUM CHLORIDE 600; 310; 30; 20 MG/100ML; MG/100ML; MG/100ML; MG/100ML
INJECTION, SOLUTION INTRAVENOUS CONTINUOUS
Status: DISCONTINUED | OUTPATIENT
Start: 2018-10-06 | End: 2018-10-06 | Stop reason: HOSPADM

## 2018-10-06 RX ORDER — METOCLOPRAMIDE HYDROCHLORIDE 5 MG/ML
10 INJECTION INTRAMUSCULAR; INTRAVENOUS EVERY 6 HOURS PRN
Status: DISCONTINUED | OUTPATIENT
Start: 2018-10-06 | End: 2018-10-11

## 2018-10-06 RX ORDER — NEOSTIGMINE METHYLSULFATE 0.5 MG/ML
INJECTION INTRAVENOUS AS NEEDED
Status: DISCONTINUED | OUTPATIENT
Start: 2018-10-06 | End: 2018-10-06 | Stop reason: SURG

## 2018-10-06 RX ORDER — ONDANSETRON 2 MG/ML
4 INJECTION INTRAMUSCULAR; INTRAVENOUS ONCE AS NEEDED
Status: DISCONTINUED | OUTPATIENT
Start: 2018-10-06 | End: 2018-10-06 | Stop reason: HOSPADM

## 2018-10-06 RX ORDER — BUPIVACAINE HYDROCHLORIDE AND EPINEPHRINE 2.5; 5 MG/ML; UG/ML
INJECTION, SOLUTION INFILTRATION; PERINEURAL AS NEEDED
Status: DISCONTINUED | OUTPATIENT
Start: 2018-10-06 | End: 2018-10-06 | Stop reason: HOSPADM

## 2018-10-06 RX ORDER — 0.9 % SODIUM CHLORIDE 0.9 %
VIAL (ML) INJECTION
Status: COMPLETED
Start: 2018-10-06 | End: 2018-10-06

## 2018-10-06 RX ORDER — MORPHINE SULFATE 4 MG/ML
2 INJECTION, SOLUTION INTRAMUSCULAR; INTRAVENOUS EVERY 10 MIN PRN
Status: DISCONTINUED | OUTPATIENT
Start: 2018-10-06 | End: 2018-10-06 | Stop reason: HOSPADM

## 2018-10-06 RX ORDER — GLYCOPYRROLATE 0.2 MG/ML
INJECTION INTRAMUSCULAR; INTRAVENOUS AS NEEDED
Status: DISCONTINUED | OUTPATIENT
Start: 2018-10-06 | End: 2018-10-06 | Stop reason: SURG

## 2018-10-06 RX ORDER — SODIUM CHLORIDE, SODIUM LACTATE, POTASSIUM CHLORIDE, CALCIUM CHLORIDE 600; 310; 30; 20 MG/100ML; MG/100ML; MG/100ML; MG/100ML
INJECTION, SOLUTION INTRAVENOUS CONTINUOUS PRN
Status: DISCONTINUED | OUTPATIENT
Start: 2018-10-06 | End: 2018-10-06

## 2018-10-06 RX ORDER — HALOPERIDOL 5 MG/ML
0.25 INJECTION INTRAMUSCULAR ONCE AS NEEDED
Status: DISCONTINUED | OUTPATIENT
Start: 2018-10-06 | End: 2018-10-06 | Stop reason: HOSPADM

## 2018-10-06 RX ORDER — ACETAMINOPHEN 10 MG/ML
INJECTION, SOLUTION INTRAVENOUS AS NEEDED
Status: DISCONTINUED | OUTPATIENT
Start: 2018-10-06 | End: 2018-10-06 | Stop reason: SURG

## 2018-10-06 RX ORDER — ONDANSETRON 2 MG/ML
8 INJECTION INTRAMUSCULAR; INTRAVENOUS EVERY 6 HOURS PRN
Status: DISCONTINUED | OUTPATIENT
Start: 2018-10-06 | End: 2018-10-11

## 2018-10-06 RX ORDER — NALOXONE HYDROCHLORIDE 0.4 MG/ML
80 INJECTION, SOLUTION INTRAMUSCULAR; INTRAVENOUS; SUBCUTANEOUS AS NEEDED
Status: DISCONTINUED | OUTPATIENT
Start: 2018-10-06 | End: 2018-10-06 | Stop reason: HOSPADM

## 2018-10-06 RX ORDER — MORPHINE SULFATE 10 MG/ML
6 INJECTION, SOLUTION INTRAMUSCULAR; INTRAVENOUS EVERY 10 MIN PRN
Status: DISCONTINUED | OUTPATIENT
Start: 2018-10-06 | End: 2018-10-06 | Stop reason: HOSPADM

## 2018-10-06 RX ORDER — HEPARIN SODIUM 1000 [USP'U]/ML
INJECTION, SOLUTION INTRAVENOUS; SUBCUTANEOUS AS NEEDED
Status: DISCONTINUED | OUTPATIENT
Start: 2018-10-06 | End: 2018-10-06 | Stop reason: HOSPADM

## 2018-10-06 RX ADMIN — ACETAMINOPHEN 1000 MG: 10 INJECTION, SOLUTION INTRAVENOUS at 13:36:00

## 2018-10-06 RX ADMIN — ROCURONIUM BROMIDE 10 MG: 10 INJECTION, SOLUTION INTRAVENOUS at 12:33:00

## 2018-10-06 RX ADMIN — ROCURONIUM BROMIDE 20 MG: 10 INJECTION, SOLUTION INTRAVENOUS at 12:50:00

## 2018-10-06 RX ADMIN — GLYCOPYRROLATE 0.6 MG: 0.2 INJECTION INTRAMUSCULAR; INTRAVENOUS at 14:55:00

## 2018-10-06 RX ADMIN — ROCURONIUM BROMIDE 10 MG: 10 INJECTION, SOLUTION INTRAVENOUS at 14:37:00

## 2018-10-06 RX ADMIN — NEOSTIGMINE METHYLSULFATE 5 MG: 0.5 INJECTION INTRAVENOUS at 14:55:00

## 2018-10-06 RX ADMIN — MIDAZOLAM HYDROCHLORIDE 2 MG: 1 INJECTION INTRAMUSCULAR; INTRAVENOUS at 12:33:00

## 2018-10-06 RX ADMIN — ROCURONIUM BROMIDE 10 MG: 10 INJECTION, SOLUTION INTRAVENOUS at 13:15:00

## 2018-10-06 RX ADMIN — ROCURONIUM BROMIDE 10 MG: 10 INJECTION, SOLUTION INTRAVENOUS at 12:56:00

## 2018-10-06 RX ADMIN — ONDANSETRON 4 MG: 2 INJECTION INTRAMUSCULAR; INTRAVENOUS at 13:35:00

## 2018-10-06 RX ADMIN — SODIUM CHLORIDE: 9 INJECTION, SOLUTION INTRAVENOUS at 12:28:00

## 2018-10-06 RX ADMIN — SODIUM CHLORIDE: 9 INJECTION, SOLUTION INTRAVENOUS at 14:55:00

## 2018-10-06 NOTE — PROGRESS NOTES
[facility]    Progress Note    Kat Ohara Patient Status:  Inpatient    10/18/1960 MRN [id number]   Location [facility] 4W/SW/SE Attending Cece Phillip MD   [facility] Day # 15 PCP Tamiko Carpenter MD       Subjective:    So Dorman cooperative  Neck: no JVD and supple, symmetrical, trachea midline  Pulmonary: No labored breathing, equal respiratory excursion  Cardiovascular: regular rate and rhythm  Abdominal: soft, non-tender; bowel sounds normal; no masses,  no organomegaly and CORA 10/6/2018  CONCLUSION:  1. Partial distal small bowel obstruction. 2. Additional followup delayed radiograph of the abdomen recommended to document further evacuation of contrast from the small bowel.      Dictated by (CST): Selena Hayes MD on 10

## 2018-10-06 NOTE — ANESTHESIA POSTPROCEDURE EVALUATION
Patient:  Nancy Walsh    Procedure Summary     Date:  10/06/18 Room / Location:  [facility] OR 05 / [facility] OR    Anesthesia Start:  4948 Anesthesia Stop:  6413    Procedure:  LAPAROSCOPIC COLON RESECTION - RIGHT (N/A Abdomen) Diagnosis:       Small greg

## 2018-10-06 NOTE — PLAN OF CARE
Pt to surgery in am with Dr. Osman Segura. Returned to floor alert and oriented. PCA for pain. VSS. NG in place to LIS. Lap sites CDI. Family at bedside.

## 2018-10-06 NOTE — ANESTHESIA PROCEDURE NOTES
ANESTHESIA INTUBATION  Date/Time: 10/6/2018 12:38 PM  Urgency: elective      General Information and Staff    Patient location during procedure: OR  Anesthesiologist: Kori Noland MD  Performed: anesthesiologist     Indications and Patient Condition  Ind

## 2018-10-06 NOTE — PLAN OF CARE
Problem: Patient Centered Care  Goal: Patient preferences are identified and integrated in the patient's plan of care  Interventions:  - What would you like us to know as we care for you?  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

## 2018-10-06 NOTE — BRIEF OP NOTE
Pre-Operative Diagnosis: Small bowel obstruction ([facility] [address] [K56.609]     Post-Operative Diagnosis: SMALL BOWEL OBSTRUCTION, INCARCERATED INCISIONAL VENTRAL HERNIA      Procedure Performed:   Procedure(s):  DIAGNOSTIC LAPAROSCOPY, EXTENSIVE LYSIS OF ADHESSIONS,

## 2018-10-06 NOTE — OPERATIVE REPORT
[facility]    PATIENT'S NAME: Valencia Rolando   ATTENDING PHYSICIAN: Zeeshan Gomez MD   OPERATING PHYSICIAN: Castro Pizarro MD   PATIENT ACCOUNT#:   [de-identified]    LOCATION:  [address] [place] [address] RECORD #:   [id number]       DATE operating table in supine position. General anesthetic was administered via endotracheal tube anesthesia. The stomach was decompressed with a nasogastric tube. Bladder was decompressed with a Obrien catheter.   The abdomen was prepped and draped in steril approximately 15 mmHg. Further lysis of adhesions was performed, running the small bowel from the ligament of Treitz to the terminal ileum. There were dense adhesions present. These were freed up completely with scissors and blunt dissection.   There wer the operating table to the recovery room, extubated, in stable condition. All counts were correct at the end of the case.     Dictated By Shruthi Tracey., MD  d: 10/06/2018 15:11:54  t: 10/06/2018 17:48:04  [facility] [id number]  MM/    cc: Arias Turcios

## 2018-10-06 NOTE — ANESTHESIA PREPROCEDURE EVALUATION
Anesthesia PreOp Note    HPI:     Scarlett Kline is a 62year old female who presents for preoperative consultation requested by: Jonathan Arias MD    Date of Surgery: 9/23/2018 - 10/6/2018    Procedure(s):  LAPAROSCOPIC COLON RESECTION - RIGHT  Ania RIGHT  No date:       Comment:  x3  1985: OTHER      Comment:  diagnostic laparoscopy for infertility  2012: OTHER SURGICAL HISTORY      Comment:  interlaminar epidural steroid injection L4-L5      Medications Prior to Admission:  HYDROcodone-aceta (COMPAZINE) injection 10 mg 10 mg Intravenous Q6H PRN Evelin Perla MD 10 mg at 09/27/18 1733    escitalopram (LEXAPRO) tablet 20 mg 20 mg Oral Daily Suzan Nunez MD 20 mg at 10/05/18 2043    Alfuzosin HCl ER (UROXATRAL) 24 hr tab 10 mg 10 mg [employer] needs - non-medical: Not on file    Occupational History      Not on file    Tobacco Use      Smoking status: Never Smoker      Smokeless tobacco: Never Used    Substance and Sexual Activity      Alcohol use:  Yes        Alcohol/week: 0.0 oz arm Right arm   Pulse: 90 101 94 86   Resp: 18 16 18 18   Temp: 98.6 °F (37 °C) 98.7 °F (37.1 °C) 98.5 °F (36.9 °C) 98.2 °F (36.8 °C)   TempSrc: Oral Oral Oral Oral   SpO2: 97% 99% 96% 97%   Weight:    60.4 kg (133 lb 2 oz)   Height:            Anesthesia

## 2018-10-06 NOTE — PROGRESS NOTES
[facility]    Progress Note    Leocadia Just Patient Status:  Inpatient    10/18/1960 MRN [id number]   Location [address] UNIT Attending De Gomez Day # 15 PCP Pau Marshall MD coordinating care with nurse and counseling pt about pain surgery                          Results:     Lab Results   Component Value Date    WBC 8.3 10/06/2018    HGB 13.0 10/06/2018    HCT 39.5 10/06/2018     (H) 10/06/2018    CREATSERUM 0.70 10/0

## 2018-10-07 PROCEDURE — 99232 SBSQ HOSP IP/OBS MODERATE 35: CPT | Performed by: HOSPITALIST

## 2018-10-07 RX ORDER — SODIUM CHLORIDE 9 MG/ML
INJECTION, SOLUTION INTRAVENOUS
Status: COMPLETED
Start: 2018-10-07 | End: 2018-10-07

## 2018-10-07 NOTE — PROGRESS NOTES
[place] [facility] - [facility]    Progress Note    [facility] Patient Status:  Inpatient    10/18/1960 MRN [id number]   Location [facility] 4W/SW/SE Attending De Gomez Day # 14 PCP Monica Martin MD        Subjective: (H) 10/07/2018    HGB 11.8 (L) 10/07/2018    HCT 35.2 10/07/2018     (H) 10/07/2018    CREATSERUM 0.63 10/07/2018    BUN 15 10/07/2018     10/07/2018    K 4.6 10/07/2018     10/07/2018    CO2 26 10/07/2018     (H) 10/07/2018    CA

## 2018-10-07 NOTE — PROGRESS NOTES
[facility]    Progress Note    Chantelle Fuentes Patient Status:  Inpatient    10/18/1960 MRN [id number]   Location [facility] 4W/SW/SE Attending Rogelio Carmona MD   [facility] Day # 15 PCP Josh Medarno MD       Subjective:    Kriss Garcia < >  >60  >60  >60  >60   GFRNAA  >60  >60   < >  >60  >60  >60  >60   CA  8.8  8.8   < >  8.5  8.8  9.2  8.5   ALB  2.8*   --   2.6*   --    --    --    NA  135*  135*   < >  136  137  140  139   K  4.0  4.0  4.0   < >  4.0  4.4  4.2  4.6   CL  101  101

## 2018-10-08 PROCEDURE — 99233 SBSQ HOSP IP/OBS HIGH 50: CPT | Performed by: HOSPITALIST

## 2018-10-08 RX ORDER — SODIUM CHLORIDE 9 MG/ML
INJECTION, SOLUTION INTRAVENOUS
Status: COMPLETED
Start: 2018-10-08 | End: 2018-10-08

## 2018-10-08 NOTE — PROGRESS NOTES
[facility] ID PROGRESS NOTE    Andrea Nicholas Patient Status:  Inpatient    10/18/1960 MRN [id number]   Location [facility] 4W/SW/SE Attending De Gomez Bety Day # 15 PCP Aníbal Ramires MD     Subjective:  Awake, still no flatus.  On E. coli, Strep anginosus, C. albicans, bacteroides, fluconazole added.  Post-op course c/b ileus, NGT replaced 9/28, PICC placed 10/1 with TPN initiated, wbc up to 17.4 yesterday on POD#9, CT abd/pelvis with no clear abscess identified, KUB with possible sm

## 2018-10-08 NOTE — DIETARY NOTE
ADULT NUTRITION REASSESSMENT     Pt is at high nutrition risk. Pt meets malnutrition criteria.         CRITERIA FOR MALNUTRITION DIAGNOSIS:  Criteria for non-severe malnutrition diagnosis: acute illness/injury related to body fat mild depletion and muscl monitor plans    ADMITTING DIAGNOSIS:   Acute appendicitis with localized peritonitis [K35.3]    Past Medical History   has a past medical history of Abnormal mammogram with microcalcification, Chicken pox, Endometriosis, SADE (generalized anxiety disorder) famoTIDine  20 mg Intravenous Q12H   • piperacillin-tazobactam  3.375 g Intravenous Q8H   • acetaminophen  1,000 mg Intravenous Q6H       LABS: reviewed  Recent Labs      10/06/18   0615  10/07/18   0541  10/08/18   0615   GLU  142*  147*  155*   BUN  20

## 2018-10-08 NOTE — PLAN OF CARE
Problem: Patient Centered Care  Goal: Patient preferences are identified and integrated in the patient's plan of care  Interventions:  - What would you like us to know as we care for you?  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

## 2018-10-08 NOTE — PROGRESS NOTES
[place] [facility] - [facility]    Progress Note    Natalio Craig Patient Status:  Inpatient    10/18/1960 MRN [id number]   Location [facility] 4W/SW/SE Attending De Gomez Day # 15 PCP Amber Carlson MD        Subjective: katarzyna and counseling pt about pain surgery                 Results:     Lab Results   Component Value Date    WBC 8.0 10/08/2018    HGB 12.6 10/08/2018    HCT 37.9 10/08/2018     (H) 10/08/2018    CREATSERUM 0.67 10/08/2018    BUN 11 10/08/2018

## 2018-10-08 NOTE — PROGRESS NOTES
[place] [facility] - [facility]    Progress Note    Emerald Leo Patient Status:  Inpatient    10/18/1960 MRN [id number]   Location [facility] 4W/SW/SE Attending De Gomez Day # 15 PCP Harlan Moseley MD       Subjective:   Sara Carbajal >  0.70  0.63  0.67   GFRAA  >60  >60   < >  >60   < >  >60  >60  >60   GFRNAA  >60  >60   < >  >60   < >  >60  >60  >60   CA  8.8  8.8   < >  8.5   < >  9.2  8.5  8.6   ALB  2.8*   --   2.6*   --    --    --    --    NA  135*  135*   < >  136   < >  140

## 2018-10-09 PROCEDURE — 99232 SBSQ HOSP IP/OBS MODERATE 35: CPT | Performed by: HOSPITALIST

## 2018-10-09 RX ORDER — METOCLOPRAMIDE HYDROCHLORIDE 5 MG/ML
10 INJECTION INTRAMUSCULAR; INTRAVENOUS EVERY 6 HOURS
Status: DISCONTINUED | OUTPATIENT
Start: 2018-10-09 | End: 2018-10-11

## 2018-10-09 NOTE — PROGRESS NOTES
[facility] ID PROGRESS NOTE    Erickson Goodwin Patient Status:  Inpatient    10/18/1960 MRN [id number]   Location [facility] 4W/SW/SE Attending De Gomez Bety Day # 12 PCP Briseyda Villalobos MD     Subjective:  Awake, still no flatus.  Us 9/23, cx with E. coli, Strep anginosus, C. albicans, bacteroides, fluconazole added.  Post-op course c/b ileus, NGT replaced 9/28, PICC placed 10/1 with TPN initiated, wbc up to 17.4 yesterday on POD#9, CT abd/pelvis with no clear abscess identified, KUB wi

## 2018-10-09 NOTE — PROGRESS NOTES
[place] [facility] - [facility]    Progress Note    Jericho Rowley Patient Status:  Inpatient    10/18/1960 MRN [id number]   Location [facility] 4W/SW/SE Attending De Gomez Day # 16 PCP Jaspreet Matthews MD        Subjective: which 15 min spent on  counseling pt about pain surgery/ng                       Results:     Lab Results   Component Value Date    WBC 6.7 10/09/2018    HGB 11.4 (L) 10/09/2018    HCT 33.8 (L) 10/09/2018     (H) 10/09/2018    CREATSERUM 0.60 10/09/

## 2018-10-09 NOTE — PROGRESS NOTES
[place] [facility] - [facility]    Progress Note    César Cobian Patient Status:  Inpatient    10/18/1960 MRN [id number]   Location [facility] 4W/SW/SE Attending De Gomez # 16 PCP Pam Mathew MD       Subjective:   Lily Avila 8.6  8.6   ALB  2.6*   --    --    --    --    NA  136   < >  139  138  139   K  4.0   < >  4.6  4.2  4.0   CL  104   < >  107  104  104   CO2  25   < >  26  27  28   ALKPHO  80   --    --    --    --    AST  26   --    --    --    --    ALT  32   --    --

## 2018-10-09 NOTE — PAYOR COMM NOTE
--------------REQUESTING ADDITIONAL DAYS 10/8 AND 10/9    CONTINUED STAY REVIEW    Payor: Alvaro Chaves P[employer]NER  Subscriber #:  [id number]  Authorization Number: [id number]    Admit date: 9/23/18  Admit time: [address]    Admitting Physician: Leandro You MD  Attending P Date Action Dose Route User    10/8/2018 2243 New Bag 30 mg Intravenous Shailesh Sanchez RN      Normal Saline Flush 0.9 % injection 10 mL     Date Action Dose Route User    10/9/2018 0455 Given 30 mL Intravenous Shailesh Sanchez RN    10/8/2018 2113 Gi NA  137  140  139  138  139   K  4.4  4.2  4.6  4.2  4.0   CL  105  106  107  104  104   CO2  25  27  26  27  28   BUN  12  20  15  11  14   CREATSERUM  0.65  0.70  0.63  0.67  0.60             Recent Labs   Lab  10/04/18   0509   ALT  32   AST  26      Pr -  General surgery note reviewed. -  Follow fever curve, wbc.   -  Reviewed labs, micro, imaging reports.  -  Case d/w patient, RN.     Bryce Rowland [facility]  [phone]  10/4/2018          Attestation signed by Leonor Pulmonary/Chest: Effort normal and breath sounds normal.   Abdominal: Soft. Neurological: She is alert and oriented to person, place, and time. Skin: Skin is warm and dry. She is not diaphoretic.    Psychiatric: Her behavior is normal.             Asses Izabella Bell Patient Status:  Inpatient    10/18/1960 MRN [id number]   Location [facility] 4W/SW/SE Attending De Gomez [place] Day # 15 PCP Mela Chavez MD         Subjective:      Constitutional: Negative for appetite pena 37 min spent on pt of which 20 min spent on coordinating care with nurse/dr Wandy Alford and counseling pt about pain surgery                     Results:            Lab Results   Component Value Date     WBC 8.0 10/08/2018     HGB 12.6 10/08/2018     HCT 37.

## 2018-10-10 PROCEDURE — 99232 SBSQ HOSP IP/OBS MODERATE 35: CPT | Performed by: HOSPITALIST

## 2018-10-10 RX ORDER — ACETAMINOPHEN 500 MG
1000 TABLET ORAL EVERY 8 HOURS
Status: DISCONTINUED | OUTPATIENT
Start: 2018-10-10 | End: 2018-10-11

## 2018-10-10 RX ORDER — SODIUM CHLORIDE 9 MG/ML
INJECTION, SOLUTION INTRAVENOUS
Status: COMPLETED
Start: 2018-10-10 | End: 2018-10-10

## 2018-10-10 NOTE — PROGRESS NOTES
[place] [facility] - [facility]    Progress Note    Arlene Early Patient Status:  Inpatient    10/18/1960 MRN [id number]   Location [facility] 4W/SW/SE Attending Sebastian Galeano, [address] Day # 16 PCP Susan Luna MD       Subjective:    Clary Dye 7.0 10/10/2018    HGB 11.6 (L) 10/10/2018    HCT 34.3 (L) 10/10/2018     (H) 10/10/2018    CREATSERUM 0.58 10/10/2018    BUN 13 10/10/2018     10/10/2018    K 4.0 10/10/2018     10/10/2018    CO2 26 10/10/2018     (H) 10/10/2018 discussing labs and imaging findings. Spoke with consultant. All questions answered.          10/10/2018

## 2018-10-10 NOTE — CM/SW NOTE
CTL progression of care note:  Per discharge rounds  - N/G tube removed. To start clear liquid diet. Ambulating. CM/SW to remain available for discharge planning/assitance as needed.

## 2018-10-10 NOTE — PROGRESS NOTES
[place] [facility] - [facility]    Progress Note    Lizy Evangelista Patient Status:  Inpatient    10/18/1960 MRN [id number]   Location [facility] 4W/SW/SE Attending De Gomez # 16 PCP Alexi Tanner MD       Subjective:   Nadeem Door 2.5*   NA  136   < >  138  139  142   K  4.0   < >  4.2  4.0  4.0   CL  104   < >  104  104  108   CO2  25   < >  27  28  26   ALKPHO  80   --    --    --   129*   AST  26   --    --    --   36   ALT  32   --    --    --   78*   BILT  0.7   --    --    --

## 2018-10-10 NOTE — PROGRESS NOTES
[facility] ID PROGRESS NOTE    Kat Ohara Patient Status:  Inpatient    10/18/1960 MRN [id number]   Location [facility] 4W/SW/SE Attending De Gomez Bety Day # 16 PCP Tamiko Carpenter MD     Subjective:  Awake, passing gas with BM cecectomy with drainage of abscess with CORA drain placement 9/23, cx with E. coli, Strep anginosus, C. albicans, bacteroides, fluconazole added.  Post-op course c/b ileus, NGT replaced 9/28, PICC placed 10/1 with TPN initiated, wbc up to 17.4 yesterday on PO

## 2018-10-11 VITALS
WEIGHT: 133.38 LBS | OXYGEN SATURATION: 98 % | DIASTOLIC BLOOD PRESSURE: 83 MMHG | BODY MASS INDEX: 20.93 KG/M2 | HEART RATE: 106 BPM | HEIGHT: 67 IN | RESPIRATION RATE: 16 BRPM | SYSTOLIC BLOOD PRESSURE: 132 MMHG | TEMPERATURE: 99 F

## 2018-10-11 PROCEDURE — 99239 HOSP IP/OBS DSCHRG MGMT >30: CPT | Performed by: HOSPITALIST

## 2018-10-11 RX ORDER — HYDROCODONE BITARTRATE AND ACETAMINOPHEN 5; 325 MG/1; MG/1
1 TABLET ORAL EVERY 6 HOURS PRN
Status: DISCONTINUED | OUTPATIENT
Start: 2018-10-11 | End: 2018-10-11

## 2018-10-11 RX ORDER — HYDROCODONE BITARTRATE AND ACETAMINOPHEN 5; 325 MG/1; MG/1
2 TABLET ORAL EVERY 6 HOURS PRN
Status: DISCONTINUED | OUTPATIENT
Start: 2018-10-11 | End: 2018-10-11

## 2018-10-11 RX ORDER — TRAMADOL HYDROCHLORIDE 50 MG/1
100 TABLET ORAL EVERY 6 HOURS PRN
Status: DISCONTINUED | OUTPATIENT
Start: 2018-10-11 | End: 2018-10-11

## 2018-10-11 RX ORDER — TRAMADOL HYDROCHLORIDE 50 MG/1
100 TABLET ORAL EVERY 6 HOURS PRN
Qty: 30 TABLET | Refills: 0 | Status: SHIPPED | OUTPATIENT
Start: 2018-10-11 | End: 2018-12-19

## 2018-10-11 RX ORDER — ZOLPIDEM TARTRATE 5 MG/1
5 TABLET ORAL NIGHTLY PRN
Status: DISCONTINUED | OUTPATIENT
Start: 2018-10-11 | End: 2018-10-11

## 2018-10-11 NOTE — PROGRESS NOTES
[place] [facility] - [facility]    Progress Note    César Cobian Patient Status:  Inpatient    10/18/1960 MRN [id number]   Location [facility] 4W/SW/SE Attending De Gomez Day # 25 PCP Pam Mathew MD       Subjective:   Lily Avila 28  26  25   ALKPHO   --   129*   --    AST   --   36   --    ALT   --   78*   --    BILT   --   0.4   --    TP   --   6.3   --                          Time spent in direct patient contact and decision making as well as counseling/coordination of care:

## 2018-10-12 ENCOUNTER — PATIENT OUTREACH (OUTPATIENT)
Dept: CASE MANAGEMENT | Age: 58
End: 2018-10-12

## 2018-10-12 DIAGNOSIS — Z02.9 ENCOUNTERS FOR ADMINISTRATIVE PURPOSE: ICD-10-CM

## 2018-10-12 PROCEDURE — 1111F DSCHRG MED/CURRENT MED MERGE: CPT

## 2018-10-15 NOTE — PROGRESS NOTES
Initial Post Discharge Follow Up   Discharge Date: 10/11/18  Contact Date: 10/12/2018    Consent Verification:  Assessment Completed With: Patient  HIPAA Verified?   Yes    Discharge Dx:    Acute appendicitis with localized peritonitis S/P Laparoscopic a discharge instructions? No  • Before leaving the hospital was your diagnoses explained to you? Yes  • Do you have any questions about your diagnoses? No  • Are you able to perform normal daily activities of living as you have prior to your hospital stay?  y 12:00 PM CDT POSTOP with Noni Velez MD [facility] SURGERY ([place] at [address] [place] [address])        137 [facility] at [address] [place] [address]  [address] [address] [phone]          PCP TCM/HFU appointment: sched

## 2018-10-17 RX ORDER — ZOLPIDEM TARTRATE 10 MG/1
10 TABLET ORAL NIGHTLY
Qty: 90 TABLET | Refills: 3 | Status: SHIPPED | OUTPATIENT
Start: 2018-10-17 | End: 2019-05-09

## 2018-10-17 NOTE — TELEPHONE ENCOUNTER
Requested Prescriptions     Pending Prescriptions Disp Refills   • Zolpidem Tartrate 10 MG Oral Tab 90 tablet 3     Sig: Take 1 tablet (10 mg total) by mouth nightly.    Last office visit: 8-7-18  Medication last refilled: 4-13-18 # 90 x 3

## 2018-10-17 NOTE — DISCHARGE SUMMARY
[place] [facility]    Discharge Summary    [facility] [address] Patient Status:  Inpatient    10/18/1960 MRN [id number]   Location [facility] 4W/SW/SE Attending No att. providers found   [address] Day # 25 PCP Brenna Hendricks MD     Date of Admis diarrhea or constipation.   Denies having eaten anything out of the ordinary, denies having experienced 100 symptoms in the past.  CT scan done in the ER shows evidence of acute appendicitis with multiple appendicoliths.           Hospital Course:         A (two) times daily as needed for Anxiety. Quantity:  60 tablet  Refills:  1     escitalopram 20 MG Tabs  Commonly known as:  LEXAPRO      Take 1 tablet (20 mg total) by mouth daily.    Quantity:  90 tablet  Refills:  3     HYDROcodone-acetaminophen

## 2018-10-23 ENCOUNTER — OFFICE VISIT (OUTPATIENT)
Dept: INTERNAL MEDICINE CLINIC | Facility: CLINIC | Age: 58
End: 2018-10-23
Payer: COMMERCIAL

## 2018-10-23 VITALS
OXYGEN SATURATION: 97 % | WEIGHT: 137 LBS | HEIGHT: 67 IN | BODY MASS INDEX: 21.5 KG/M2 | HEART RATE: 87 BPM | DIASTOLIC BLOOD PRESSURE: 72 MMHG | SYSTOLIC BLOOD PRESSURE: 110 MMHG

## 2018-10-23 DIAGNOSIS — F32.89 OTHER DEPRESSION: ICD-10-CM

## 2018-10-23 DIAGNOSIS — K35.33 APPENDICITIS WITH PERITONITIS: Primary | ICD-10-CM

## 2018-10-23 DIAGNOSIS — Z87.19 H/O SMALL BOWEL OBSTRUCTION: ICD-10-CM

## 2018-10-23 PROCEDURE — 99495 TRANSJ CARE MGMT MOD F2F 14D: CPT | Performed by: INTERNAL MEDICINE

## 2018-10-23 PROCEDURE — 1111F DSCHRG MED/CURRENT MED MERGE: CPT | Performed by: INTERNAL MEDICINE

## 2018-10-23 NOTE — PROGRESS NOTES
HPI:    Patient ID: Jericho Rowley is a 62year old female. Pt here for a hospital fu after a 19 day hospial stay strted from a ruptured appendecitis and then complication of SBO with an incarcerated hernia. Is feeling very exhausted and feels some reac

## 2018-10-23 NOTE — PROGRESS NOTES
HPI:    Katia Yu is a 62year old female here today for hospital follow up.    She was discharged from Inpatient hospital[facility]  to Home   Admission Date: 9/23/18   Discharge Date: 10/11/18  Hospital Discharge Diagnoses (since 9/23/2018) prior to visit.        HISTORY: reconciled and review with patient  She  has a past medical history of Abnormal mammogram with microcalcification (2011), Chicken pox, Endometriosis (1985), SADE (generalized anxiety disorder), GERD (gastroesophageal reflux di Cardiovascular: Normal rate and regular rhythm. No murmur heard. Edema not present. Pulmonary/Chest: She has no wheezes. She has no rales. Abdominal:   Well healed lap scars   Neurological: She is alert and oriented to person, place, and time. (six) hours as needed for Pain. Disp: 100 tablet Rfl: 0   ClonazePAM 2 MG Oral Tab Take 1 tablet (2 mg total) by mouth 2 (two) times daily as needed for Anxiety.  Disp: 60 tablet Rfl: 1   escitalopram (LEXAPRO) 20 MG Oral Tab Take 1 tablet (20 mg total) by

## 2018-11-28 RX ORDER — CLONAZEPAM 2 MG/1
2 TABLET ORAL 2 TIMES DAILY PRN
Qty: 60 TABLET | Refills: 1 | Status: SHIPPED | OUTPATIENT
Start: 2018-11-28 | End: 2019-01-20

## 2018-11-28 NOTE — TELEPHONE ENCOUNTER
Requested Prescriptions     Pending Prescriptions Disp Refills   • ClonazePAM 2 MG Oral Tab 60 tablet 1     Sig: Take 1 tablet (2 mg total) by mouth 2 (two) times daily as needed for Anxiety.      Last office visit: 10-23-18  Medication last refilled: 9-14-

## 2018-12-10 ENCOUNTER — HOSPITAL ENCOUNTER (OUTPATIENT)
Dept: BONE DENSITY | Facility: HOSPITAL | Age: 58
Discharge: HOME | End: 2018-12-10
Attending: INTERNAL MEDICINE
Payer: COMMERCIAL

## 2018-12-10 ENCOUNTER — HOSPITAL ENCOUNTER (OUTPATIENT)
Dept: MAMMOGRAPHY | Facility: HOSPITAL | Age: 58
Discharge: HOME | End: 2018-12-10
Attending: INTERNAL MEDICINE
Payer: COMMERCIAL

## 2018-12-10 DIAGNOSIS — Z00.00 WELLNESS EXAMINATION: ICD-10-CM

## 2018-12-10 DIAGNOSIS — M81.8 OSTEOPOROSIS OF DISUSE: ICD-10-CM

## 2018-12-10 PROCEDURE — 77067 SCR MAMMO BI INCL CAD: CPT | Performed by: INTERNAL MEDICINE

## 2018-12-10 PROCEDURE — 77080 DXA BONE DENSITY AXIAL: CPT | Performed by: INTERNAL MEDICINE

## 2018-12-10 PROCEDURE — 77063 BREAST TOMOSYNTHESIS BI: CPT | Performed by: INTERNAL MEDICINE

## 2018-12-20 NOTE — TELEPHONE ENCOUNTER
HOSPITALIST NURSE   TELEPHONE NOTE    Refill request received by the Hospitalist office. Hospitalist team unable to approve refills. Request routed to PCP.

## 2018-12-21 RX ORDER — TRAMADOL HYDROCHLORIDE 50 MG/1
100 TABLET ORAL EVERY 6 HOURS PRN
Qty: 30 TABLET | Refills: 0 | Status: SHIPPED | OUTPATIENT
Start: 2018-12-21 | End: 2019-01-20

## 2019-01-21 RX ORDER — CLONAZEPAM 2 MG/1
2 TABLET ORAL 2 TIMES DAILY PRN
Qty: 60 TABLET | Refills: 1 | Status: SHIPPED | OUTPATIENT
Start: 2019-01-21 | End: 2019-03-29

## 2019-01-21 RX ORDER — TRAMADOL HYDROCHLORIDE 50 MG/1
100 TABLET ORAL EVERY 6 HOURS PRN
Qty: 30 TABLET | Refills: 0 | Status: SHIPPED | OUTPATIENT
Start: 2019-01-21 | End: 2019-01-21

## 2019-01-21 NOTE — TELEPHONE ENCOUNTER
Requested Prescriptions     Pending Prescriptions Disp Refills   • ClonazePAM 2 MG Oral Tab 60 tablet 1     Sig: Take 1 tablet (2 mg total) by mouth 2 (two) times daily as needed for Anxiety.    • TraMADol HCl 50 MG Oral Tab 30 tablet 0     Sig: Take 2 tabl

## 2019-02-15 RX ORDER — HYDROCODONE BITARTRATE AND ACETAMINOPHEN 10; 325 MG/1; MG/1
1 TABLET ORAL EVERY 6 HOURS PRN
Qty: 100 TABLET | Refills: 0 | Status: SHIPPED | OUTPATIENT
Start: 2019-02-15 | End: 2019-05-21

## 2019-03-21 RX ORDER — ESCITALOPRAM OXALATE 20 MG/1
TABLET ORAL
Qty: 90 TABLET | Refills: 3 | Status: CANCELLED | OUTPATIENT
Start: 2019-03-21

## 2019-03-21 RX ORDER — ESCITALOPRAM OXALATE 20 MG/1
20 TABLET ORAL DAILY
Qty: 90 TABLET | Refills: 3 | Status: CANCELLED | OUTPATIENT
Start: 2019-03-21 | End: 2020-03-15

## 2019-03-29 RX ORDER — CLONAZEPAM 2 MG/1
2 TABLET ORAL 2 TIMES DAILY PRN
Qty: 60 TABLET | Refills: 1 | Status: SHIPPED | OUTPATIENT
Start: 2019-03-29 | End: 2019-05-21

## 2019-03-29 NOTE — TELEPHONE ENCOUNTER
Requested Prescriptions     Pending Prescriptions Disp Refills   • clonazePAM 2 MG Oral Tab 60 tablet 1     Sig: Take 1 tablet (2 mg total) by mouth 2 (two) times daily as needed for Anxiety.      Last office visit: 10-23-18  Medication last refilled: 1-21-

## 2019-05-10 ENCOUNTER — TELEPHONE (OUTPATIENT)
Dept: INTERNAL MEDICINE CLINIC | Facility: CLINIC | Age: 59
End: 2019-05-10

## 2019-05-10 RX ORDER — TRAMADOL HYDROCHLORIDE 50 MG/1
100 TABLET ORAL EVERY 6 HOURS PRN
Qty: 90 TABLET | Refills: 2 | Status: SHIPPED | OUTPATIENT
Start: 2019-05-10 | End: 2019-08-25

## 2019-05-10 RX ORDER — ZOLPIDEM TARTRATE 10 MG/1
10 TABLET ORAL NIGHTLY
Qty: 90 TABLET | Refills: 3 | Status: SHIPPED | OUTPATIENT
Start: 2019-05-10 | End: 2020-01-21

## 2019-05-10 NOTE — TELEPHONE ENCOUNTER
Requested Prescriptions     Pending Prescriptions Disp Refills   • Zolpidem Tartrate 10 MG Oral Tab 90 tablet 3     Sig: Take 1 tablet (10 mg total) by mouth nightly.    • traMADol HCl 50 MG Oral Tab 90 tablet 2     Sig: Take 2 tablets (100 mg total) by maritza

## 2019-05-10 NOTE — TELEPHONE ENCOUNTER
Diandra, Pharmacist was given tramadol and ambien order via phone as written on prescription. Original printed copy destroyed.

## 2019-05-21 ENCOUNTER — OFFICE VISIT (OUTPATIENT)
Dept: INTERNAL MEDICINE CLINIC | Facility: CLINIC | Age: 59
End: 2019-05-21
Payer: COMMERCIAL

## 2019-05-21 VITALS
OXYGEN SATURATION: 98 % | SYSTOLIC BLOOD PRESSURE: 120 MMHG | HEART RATE: 79 BPM | BODY MASS INDEX: 22.91 KG/M2 | DIASTOLIC BLOOD PRESSURE: 88 MMHG | WEIGHT: 146 LBS | RESPIRATION RATE: 16 BRPM | HEIGHT: 67 IN

## 2019-05-21 DIAGNOSIS — F51.01 PRIMARY INSOMNIA: ICD-10-CM

## 2019-05-21 DIAGNOSIS — Z00.00 WELLNESS EXAMINATION: Primary | ICD-10-CM

## 2019-05-21 DIAGNOSIS — M47.816 LUMBAR SPONDYLOSIS: ICD-10-CM

## 2019-05-21 DIAGNOSIS — M96.1 LUMBAR POST-LAMINECTOMY SYNDROME: ICD-10-CM

## 2019-05-21 DIAGNOSIS — F32.89 OTHER DEPRESSION: ICD-10-CM

## 2019-05-21 PROCEDURE — 99396 PREV VISIT EST AGE 40-64: CPT | Performed by: INTERNAL MEDICINE

## 2019-05-21 RX ORDER — HYDROCODONE BITARTRATE AND ACETAMINOPHEN 10; 325 MG/1; MG/1
1 TABLET ORAL EVERY 6 HOURS PRN
Qty: 100 TABLET | Refills: 0 | Status: SHIPPED | OUTPATIENT
Start: 2019-05-21 | End: 2019-07-14

## 2019-05-21 RX ORDER — CLONAZEPAM 2 MG/1
2 TABLET ORAL 2 TIMES DAILY PRN
Qty: 60 TABLET | Refills: 1 | Status: SHIPPED | OUTPATIENT
Start: 2019-05-21 | End: 2019-07-14

## 2019-05-21 NOTE — PROGRESS NOTES
HPI:    Patient ID: Lillie Alvarado is a 62year old female. Pt here for a yearly check up and fu on lumbar spodylosis and depression and insomnia. Is having more back troubles recently. Has an apt w GI scheduled in 2 weeks.  Dexa and mammogram in [place] [place] Pulmonary/Chest: Effort normal. She has no wheezes. She has no rales. Abdominal: Soft. She exhibits no mass. There is no tenderness.    Genitourinary:   Genitourinary Comments: Breasts no masses no nipple anamoly   Musculoskeletal: She exhibits tenderne

## 2019-05-30 ENCOUNTER — TELEPHONE (OUTPATIENT)
Dept: GASTROENTEROLOGY | Facility: CLINIC | Age: 59
End: 2019-05-30

## 2019-05-30 ENCOUNTER — OFFICE VISIT (OUTPATIENT)
Dept: GASTROENTEROLOGY | Facility: CLINIC | Age: 59
End: 2019-05-30
Payer: COMMERCIAL

## 2019-05-30 VITALS
HEIGHT: 67 IN | HEART RATE: 83 BPM | WEIGHT: 145 LBS | DIASTOLIC BLOOD PRESSURE: 79 MMHG | SYSTOLIC BLOOD PRESSURE: 117 MMHG | BODY MASS INDEX: 22.76 KG/M2

## 2019-05-30 DIAGNOSIS — Z12.11 SCREENING FOR COLORECTAL CANCER: Primary | ICD-10-CM

## 2019-05-30 DIAGNOSIS — K21.9 GASTROESOPHAGEAL REFLUX DISEASE, ESOPHAGITIS PRESENCE NOT SPECIFIED: ICD-10-CM

## 2019-05-30 DIAGNOSIS — Z12.12 SCREENING FOR COLORECTAL CANCER: Primary | ICD-10-CM

## 2019-05-30 DIAGNOSIS — Z12.11 COLON CANCER SCREENING: Primary | ICD-10-CM

## 2019-05-30 PROCEDURE — 99212 OFFICE O/P EST SF 10 MIN: CPT | Performed by: INTERNAL MEDICINE

## 2019-05-30 PROCEDURE — 99243 OFF/OP CNSLTJ NEW/EST LOW 30: CPT | Performed by: INTERNAL MEDICINE

## 2019-05-30 NOTE — PROGRESS NOTES
HPI:    Patient ID: Izabella Bell is a 62year old female. HPI  Duncan Freeman is an [facility] RN referred by Dr. Kole Schaffer  for a discussion regarding colorectal cancer screening. She has not had a prior colonoscopy.   I have performed the daniel 0   HYDROcodone-acetaminophen  MG Oral Tab Take 1 tablet by mouth every 6 (six) hours as needed for Pain. Disp: 100 tablet Rfl: 0   clonazePAM 2 MG Oral Tab Take 1 tablet (2 mg total) by mouth 2 (two) times daily as needed for Anxiety.  Disp: 60 table - 11.0 K/UL 7.5    RBC      3.70 - 5.40 M/UL 3.59 (L)    Hemoglobin      12.0 - 16.0 g/dL 11.3 (L)    Hematocrit      35.0 - 48.0 % 33.0 (L)    MCV      80.0 - 100.0 fL 91.7    MCH      27.0 - 32.0 pg 31.3    MCHC      32.0 - 37.0 g/dl 34.2    RDW      11. and perforation requiring surgery was discussed. The risks of delayed diagnosis if testing is not performed was discussed as well.  The patient is agreeable to proceeding with a colonoscopy which will be arranged following a split dose Suprep preparation (m

## 2019-05-30 NOTE — TELEPHONE ENCOUNTER
Scheduled for:  Colonoscopy - 31094  Provider Name:  Dr. Luc Quiles  Date:  9/3/19  Location:  [facility]  Sedation:  MAC  Time:  8:15 am (pt is aware to arrive at 7:15 am)  Prep:  Suprep, Prep instructions were given to pt in the office, pt verbalized understanding.   Me

## 2019-07-15 RX ORDER — HYDROCODONE BITARTRATE AND ACETAMINOPHEN 10; 325 MG/1; MG/1
1 TABLET ORAL EVERY 6 HOURS PRN
Qty: 100 TABLET | Refills: 0 | Status: SHIPPED | OUTPATIENT
Start: 2019-07-15 | End: 2019-09-12

## 2019-07-15 RX ORDER — CLONAZEPAM 2 MG/1
2 TABLET ORAL 2 TIMES DAILY PRN
Qty: 60 TABLET | Refills: 1 | Status: SHIPPED | OUTPATIENT
Start: 2019-07-15 | End: 2019-09-15

## 2019-08-12 ENCOUNTER — TELEPHONE (OUTPATIENT)
Dept: INTERNAL MEDICINE CLINIC | Facility: CLINIC | Age: 59
End: 2019-08-12

## 2019-08-15 ENCOUNTER — NURSE ONLY (OUTPATIENT)
Dept: INTERNAL MEDICINE CLINIC | Facility: CLINIC | Age: 59
End: 2019-08-15
Payer: COMMERCIAL

## 2019-08-15 ENCOUNTER — PATIENT MESSAGE (OUTPATIENT)
Dept: GASTROENTEROLOGY | Facility: CLINIC | Age: 59
End: 2019-08-15

## 2019-08-15 DIAGNOSIS — Z00.00 WELLNESS EXAMINATION: ICD-10-CM

## 2019-08-15 LAB
ALBUMIN SERPL-MCNC: 4.1 G/DL (ref 3.4–5)
ALBUMIN/GLOB SERPL: 1.1 {RATIO} (ref 1–2)
ALP LIVER SERPL-CCNC: 56 U/L (ref 46–118)
ALT SERPL-CCNC: 18 U/L (ref 13–56)
ANION GAP SERPL CALC-SCNC: 7 MMOL/L (ref 0–18)
AST SERPL-CCNC: 16 U/L (ref 15–37)
BASOPHILS # BLD AUTO: 0.06 X10(3) UL (ref 0–0.2)
BASOPHILS NFR BLD AUTO: 1.3 %
BILIRUB SERPL-MCNC: 0.5 MG/DL (ref 0.1–2)
BUN BLD-MCNC: 13 MG/DL (ref 7–18)
BUN/CREAT SERPL: 13.8 (ref 10–20)
CALCIUM BLD-MCNC: 9.3 MG/DL (ref 8.5–10.1)
CHLORIDE SERPL-SCNC: 106 MMOL/L (ref 98–112)
CHOLEST SMN-MCNC: 247 MG/DL (ref ?–200)
CO2 SERPL-SCNC: 29 MMOL/L (ref 21–32)
CREAT BLD-MCNC: 0.94 MG/DL (ref 0.55–1.02)
DEPRECATED RDW RBC AUTO: 41.8 FL (ref 35.1–46.3)
EOSINOPHIL # BLD AUTO: 0.11 X10(3) UL (ref 0–0.7)
EOSINOPHIL NFR BLD AUTO: 2.4 %
ERYTHROCYTE [DISTWIDTH] IN BLOOD BY AUTOMATED COUNT: 12 % (ref 11–15)
GLOBULIN PLAS-MCNC: 3.6 G/DL (ref 2.8–4.4)
GLUCOSE BLD-MCNC: 92 MG/DL (ref 70–99)
HCT VFR BLD AUTO: 47.6 % (ref 35–48)
HDLC SERPL-MCNC: 66 MG/DL (ref 40–59)
HGB BLD-MCNC: 15.3 G/DL (ref 12–16)
IMM GRANULOCYTES # BLD AUTO: 0.01 X10(3) UL (ref 0–1)
IMM GRANULOCYTES NFR BLD: 0.2 %
LDLC SERPL CALC-MCNC: 153 MG/DL (ref ?–100)
LYMPHOCYTES # BLD AUTO: 1.98 X10(3) UL (ref 1–4)
LYMPHOCYTES NFR BLD AUTO: 42.5 %
M PROTEIN MFR SERPL ELPH: 7.7 G/DL (ref 6.4–8.2)
MCH RBC QN AUTO: 30.7 PG (ref 26–34)
MCHC RBC AUTO-ENTMCNC: 32.1 G/DL (ref 31–37)
MCV RBC AUTO: 95.6 FL (ref 80–100)
MONOCYTES # BLD AUTO: 0.47 X10(3) UL (ref 0.1–1)
MONOCYTES NFR BLD AUTO: 10.1 %
NEUTROPHILS # BLD AUTO: 2.03 X10 (3) UL (ref 1.5–7.7)
NEUTROPHILS # BLD AUTO: 2.03 X10(3) UL (ref 1.5–7.7)
NEUTROPHILS NFR BLD AUTO: 43.5 %
NONHDLC SERPL-MCNC: 181 MG/DL (ref ?–130)
OSMOLALITY SERPL CALC.SUM OF ELEC: 294 MOSM/KG (ref 275–295)
PATIENT FASTING: YES
PATIENT FASTING: YES
PLATELET # BLD AUTO: 289 10(3)UL (ref 150–450)
POTASSIUM SERPL-SCNC: 4.5 MMOL/L (ref 3.5–5.1)
RBC # BLD AUTO: 4.98 X10(6)UL (ref 3.8–5.3)
SODIUM SERPL-SCNC: 142 MMOL/L (ref 136–145)
TRIGL SERPL-MCNC: 141 MG/DL (ref 30–149)
VLDLC SERPL CALC-MCNC: 28 MG/DL (ref 0–30)
WBC # BLD AUTO: 4.7 X10(3) UL (ref 4–11)

## 2019-08-15 PROCEDURE — 36415 COLL VENOUS BLD VENIPUNCTURE: CPT | Performed by: INTERNAL MEDICINE

## 2019-08-15 PROCEDURE — 80053 COMPREHEN METABOLIC PANEL: CPT | Performed by: INTERNAL MEDICINE

## 2019-08-15 PROCEDURE — 80061 LIPID PANEL: CPT | Performed by: INTERNAL MEDICINE

## 2019-08-15 PROCEDURE — 85025 COMPLETE CBC W/AUTO DIFF WBC: CPT | Performed by: INTERNAL MEDICINE

## 2019-08-15 NOTE — TELEPHONE ENCOUNTER
From: Lulú Lacy  To: Kacy Byers MD  Sent: 8/15/2019 10:01 AM CDT  Subject: Non-Urgent Medical Question    I am scheduled for a colonoscopy on Sept 3rd. I'd like to add the EGD, per my ohysician's advice. Thank you!

## 2019-08-15 NOTE — PROGRESS NOTES
Patient states she she does not have any symptoms but was told by PCP to ask to add on EGD. Patient states she also spoke to Dr. Billy Cutler and he stated he would add on day of procedure if he felt neccessary.  However, patient is asking for Dr. Chris Ruiz opinion if

## 2019-08-16 ENCOUNTER — TELEPHONE (OUTPATIENT)
Dept: GASTROENTEROLOGY | Facility: CLINIC | Age: 59
End: 2019-08-16

## 2019-08-16 DIAGNOSIS — K21.9 GASTROESOPHAGEAL REFLUX DISEASE, ESOPHAGITIS PRESENCE NOT SPECIFIED: ICD-10-CM

## 2019-08-16 DIAGNOSIS — Z12.11 COLON CANCER SCREENING: Primary | ICD-10-CM

## 2019-08-16 NOTE — TELEPHONE ENCOUNTER
Dora Moy MD 14 hours ago (7:29 PM)         An upper endoscopy is not unreasonable. This can be added on. Please let Mora Alvarez know.          Documentation       You routed conversation to Dora Moy MD 19 hours ago (2:59 PM)      You 19

## 2019-08-16 NOTE — TELEPHONE ENCOUNTER
Regarding: RE: Non-Urgent Medical Question  ----- Message from Melissa Weeks MD sent at 8/15/2019  7:29 PM CDT -----       ----- Message sent from Landon Law RN to Jani Bradley at 8/15/2019 10:45 AM -----   Sabas,    Please call our office a

## 2019-08-16 NOTE — TELEPHONE ENCOUNTER
LMTCB to notify pt will have added EGD on 9/3/19    Spoke to [facility] and she stated she will update time for patients scheduled for 9/3/19 procedures as there is a 15 min availability but it is at 11 AM and [facility] is currently scheduled at 8:15 AM, pts would

## 2019-08-19 NOTE — TELEPHONE ENCOUNTER
GI/RN--    This patient is scheduled, see below    I sent a referral to [facility], please contact this [facility] to check for a PA.  Thank you    You may close this TE when done :)

## 2019-08-19 NOTE — TELEPHONE ENCOUNTER
Pt returned call, tried to locate , pt indicates she will try again later today, not reachable due to work, thanks.

## 2019-08-19 NOTE — TELEPHONE ENCOUNTER
Pt Surgery/Procedure: Colonoscopy and EGD 57876  Pt insurance/number to contact: [phone] [employer] waited more than 20 mins to speak to someone, will have to call back in AM.  Insurance ID# and group: [id number]  Dx: Colon cancer screening Z12.11, GERD K21.

## 2019-08-19 NOTE — TELEPHONE ENCOUNTER
CBLM to inform the patient I added the EGD to her procedure.  Please transfer to [facility] in [facility]     Rescheduled for:   From-Colonoscopy 91986 To-Colonoscopy and EGD 09459  Provider Name: Dr. Ayala Marie  Date:  9/3/19  Location:  [facility]  Sedation:  MAC  Time:   4124 (pt

## 2019-08-20 NOTE — TELEPHONE ENCOUNTER
Called insurance at number listed below. Spoke with PA  Kentrell Garibay. Provided CPT:Colonoscopy and EGD 24019. Per Kentrell Garibay no authorization is required for this outpatient procedure. Call reference number [id number].      Per krys

## 2019-08-26 RX ORDER — TRAMADOL HYDROCHLORIDE 50 MG/1
100 TABLET ORAL EVERY 6 HOURS PRN
Qty: 90 TABLET | Refills: 2 | OUTPATIENT
Start: 2019-08-26

## 2019-08-26 RX ORDER — TRAMADOL HYDROCHLORIDE 50 MG/1
100 TABLET ORAL EVERY 6 HOURS PRN
Qty: 90 TABLET | Refills: 2 | Status: SHIPPED | OUTPATIENT
Start: 2019-08-26 | End: 2019-12-03

## 2019-08-26 NOTE — TELEPHONE ENCOUNTER
Requested Prescriptions     Pending Prescriptions Disp Refills   • traMADol HCl 50 MG Oral Tab 90 tablet 2     Sig: Take 2 tablets (100 mg total) by mouth every 6 (six) hours as needed.      Last office visit: 5-21-19  Medication last refilled: 5-10-19 # 90

## 2019-08-30 ENCOUNTER — TELEPHONE (OUTPATIENT)
Dept: GASTROENTEROLOGY | Facility: CLINIC | Age: 59
End: 2019-08-30

## 2019-08-30 NOTE — TELEPHONE ENCOUNTER
Patient called to ask if she can mix prep solution with Gatorade. I informed her she needs to mix prep with water only but her additional fluid intake may be anything as long as it is not blue, red or purple in color.  Patient verbalized message was [employer]

## 2019-09-03 ENCOUNTER — ANESTHESIA EVENT (OUTPATIENT)
Dept: ENDOSCOPY | Facility: HOSPITAL | Age: 59
End: 2019-09-03
Payer: COMMERCIAL

## 2019-09-03 ENCOUNTER — HOSPITAL ENCOUNTER (OUTPATIENT)
Facility: HOSPITAL | Age: 59
Setting detail: HOSPITAL OUTPATIENT SURGERY
Discharge: HOME | End: 2019-09-03
Attending: INTERNAL MEDICINE | Admitting: INTERNAL MEDICINE
Payer: COMMERCIAL

## 2019-09-03 ENCOUNTER — ANESTHESIA (OUTPATIENT)
Dept: ENDOSCOPY | Facility: HOSPITAL | Age: 59
End: 2019-09-03
Payer: COMMERCIAL

## 2019-09-03 ENCOUNTER — TELEPHONE (OUTPATIENT)
Dept: GASTROENTEROLOGY | Facility: CLINIC | Age: 59
End: 2019-09-03

## 2019-09-03 DIAGNOSIS — Z12.11 COLON CANCER SCREENING: ICD-10-CM

## 2019-09-03 PROCEDURE — 0DJD8ZZ INSPECTION OF LOWER INTESTINAL TRACT, VIA NATURAL OR ARTIFICIAL OPENING ENDOSCOPIC: ICD-10-PCS | Performed by: INTERNAL MEDICINE

## 2019-09-03 PROCEDURE — 45378 DIAGNOSTIC COLONOSCOPY: CPT | Performed by: INTERNAL MEDICINE

## 2019-09-03 PROCEDURE — 43239 EGD BIOPSY SINGLE/MULTIPLE: CPT | Performed by: INTERNAL MEDICINE

## 2019-09-03 PROCEDURE — 0DJ08ZZ INSPECTION OF UPPER INTESTINAL TRACT, VIA NATURAL OR ARTIFICIAL OPENING ENDOSCOPIC: ICD-10-PCS | Performed by: INTERNAL MEDICINE

## 2019-09-03 RX ORDER — SODIUM CHLORIDE, SODIUM LACTATE, POTASSIUM CHLORIDE, CALCIUM CHLORIDE 600; 310; 30; 20 MG/100ML; MG/100ML; MG/100ML; MG/100ML
INJECTION, SOLUTION INTRAVENOUS CONTINUOUS
Status: DISCONTINUED | OUTPATIENT
Start: 2019-09-03 | End: 2019-09-03

## 2019-09-03 RX ORDER — ONDANSETRON 4 MG/1
TABLET, FILM COATED ORAL AS NEEDED
Status: DISCONTINUED | OUTPATIENT
Start: 2019-09-03 | End: 2019-09-03 | Stop reason: SURG

## 2019-09-03 RX ORDER — NALOXONE HYDROCHLORIDE 0.4 MG/ML
80 INJECTION, SOLUTION INTRAMUSCULAR; INTRAVENOUS; SUBCUTANEOUS AS NEEDED
Status: DISCONTINUED | OUTPATIENT
Start: 2019-09-03 | End: 2019-09-03

## 2019-09-03 RX ORDER — LIDOCAINE HYDROCHLORIDE 10 MG/ML
INJECTION, SOLUTION EPIDURAL; INFILTRATION; INTRACAUDAL; PERINEURAL AS NEEDED
Status: DISCONTINUED | OUTPATIENT
Start: 2019-09-03 | End: 2019-09-03 | Stop reason: SURG

## 2019-09-03 RX ADMIN — LIDOCAINE HYDROCHLORIDE 40 MG: 10 INJECTION, SOLUTION EPIDURAL; INFILTRATION; INTRACAUDAL; PERINEURAL at 09:09:00

## 2019-09-03 RX ADMIN — LIDOCAINE HYDROCHLORIDE 40 MG: 10 INJECTION, SOLUTION EPIDURAL; INFILTRATION; INTRACAUDAL; PERINEURAL at 08:40:00

## 2019-09-03 RX ADMIN — SODIUM CHLORIDE, SODIUM LACTATE, POTASSIUM CHLORIDE, CALCIUM CHLORIDE: 600; 310; 30; 20 INJECTION, SOLUTION INTRAVENOUS at 08:37:00

## 2019-09-03 RX ADMIN — ONDANSETRON 4 MG: 4 TABLET, FILM COATED ORAL at 08:37:00

## 2019-09-03 RX ADMIN — SODIUM CHLORIDE, SODIUM LACTATE, POTASSIUM CHLORIDE, CALCIUM CHLORIDE: 600; 310; 30; 20 INJECTION, SOLUTION INTRAVENOUS at 09:19:00

## 2019-09-03 NOTE — TELEPHONE ENCOUNTER
GI RN staff: Please enter in health maintenance that a colonoscopy was performed and enter recall for 10 years.

## 2019-09-03 NOTE — TELEPHONE ENCOUNTER
10  Year colonoscopy recall placed in system per Dr. Theo Leahy . Colonoscopy done on 09/03/19  . Next due on 09/03/29 . Snapshot updated.

## 2019-09-03 NOTE — ANESTHESIA PREPROCEDURE EVALUATION
Anesthesia PreOp Note    HPI:     Marialuisa Liao is a 62year old female who presents for preoperative consultation requested by: Beba Patino MD    Date of Surgery: 9/3/2019    Procedure(s):  COLONOSCOPY  ESOPHAGOGASTRODUODENOSCOPY (EGD)  Indica LAPAROSCOPIC APPENDECTOMY N/A 9/23/2018    Performed by Peri Cardozo MD at [facility] OR   • [address] - RIGHT N/A 10/6/2018    Performed by Peri Cardozo MD at [address]   • [facility]     • MARICHUY BIOPSY [address] Social History    Socioeconomic History      Marital status:       Spouse name: Not on file      Number of children: Not on file      Years of education: Not on file      Highest education level: Not on file    Occupational History      Not on f device. .        The patient does live in a home with stairs. Available pre-op labs reviewed.   Lab Results   Component Value Date    WBC 4.7 08/15/2019    RBC 4.98 08/15/2019    HGB 15.3 08/15/2019    HCT 47.6 08/15/2019    MCV 95.6 08/15/2019    MCH 3

## 2019-09-03 NOTE — ANESTHESIA POSTPROCEDURE EVALUATION
Patient:  Aleksey Walsh    Procedure Summary     Date:  09/03/19 Room / Location:  [facility] ENDOSCOPY 04 / [facility] ENDOSCOPY    Anesthesia Start:  1785 Anesthesia Stop:      Procedures:       COLONOSCOPY (N/A )      ESOPHAGOGASTRODUODENOSCOPY (EGD) (N/A ) Diagnosi

## 2019-09-03 NOTE — H&P
History & Physical Examination    Patient Name: Lulú Lacy  MRN: [id number]  [facility]: [id number]  YOB: 1960    Diagnosis: Colorectal cancer screening, GERD        Medications Prior to Admission:  traMADol HCl 50 MG Oral Tab Take 2 tablets ( low back pain 6/17/2014   • left S1 radiculopathy 6/17/2014   • Lower back pain    • Measles    • Menopause 2011    HRT   • Palpitations    • Pregnancy 1992, 1988, 1986    x3   • PUD (peptic ulcer disease)    • s/p left L4-5 laminectomy with L5 sacralized

## 2019-09-03 NOTE — OPERATIVE REPORT
[facility] Endoscopy Report      Date of Procedure:  09/03/19      Preoperative Diagnosis:  1. Colorectal cancer screening  2.   Gastroesophageal reflux      Postoperative Diagnosis:  Normal colonoscopy      Procedure:    Screening colonosc to the anal verge was normal with an intact vascular pattern. There were no colonic polyps, definite diverticula, mass lesions, vascular anomalies or signs of inflammation seen.   Retroflexion in the rectum revealed prominent incidental internal hemorrhoid

## 2019-09-04 VITALS
OXYGEN SATURATION: 97 % | SYSTOLIC BLOOD PRESSURE: 111 MMHG | DIASTOLIC BLOOD PRESSURE: 75 MMHG | RESPIRATION RATE: 17 BRPM | BODY MASS INDEX: 21.97 KG/M2 | HEART RATE: 67 BPM | WEIGHT: 140 LBS | HEIGHT: 67 IN

## 2019-09-06 ENCOUNTER — PATIENT MESSAGE (OUTPATIENT)
Dept: GASTROENTEROLOGY | Facility: CLINIC | Age: 59
End: 2019-09-06

## 2019-09-06 NOTE — TELEPHONE ENCOUNTER
From: Quang Pham  To: Demetrio Matthew MD  Sent: 9/6/2019 8:19 AM CDT  Subject: Non-Urgent Medical Question    Hello, I had my colonoscopy this last Tuesday. I'm just wondering how long it takes to have a bowel movement. I haven't had one yet.  Trish Crowe

## 2019-09-16 RX ORDER — HYDROCODONE BITARTRATE AND ACETAMINOPHEN 10; 325 MG/1; MG/1
1 TABLET ORAL EVERY 6 HOURS PRN
Qty: 100 TABLET | Refills: 0 | Status: SHIPPED | OUTPATIENT
Start: 2019-09-16 | End: 2019-11-24

## 2019-09-16 RX ORDER — HYDROCODONE BITARTRATE AND ACETAMINOPHEN 10; 325 MG/1; MG/1
1 TABLET ORAL EVERY 6 HOURS PRN
Qty: 100 TABLET | Refills: 0 | Status: SHIPPED | OUTPATIENT
Start: 2019-09-16 | End: 2020-01-21

## 2019-09-16 RX ORDER — CLONAZEPAM 2 MG/1
2 TABLET ORAL 2 TIMES DAILY PRN
Qty: 60 TABLET | Refills: 1 | Status: SHIPPED | OUTPATIENT
Start: 2019-09-16 | End: 2019-11-08

## 2019-09-16 NOTE — TELEPHONE ENCOUNTER
Requested Prescriptions     Pending Prescriptions Disp Refills   • HYDROcodone-acetaminophen  MG Oral Tab 100 tablet 0     Sig: Take 1 tablet by mouth every 6 (six) hours as needed for Pain.    • clonazePAM 2 MG Oral Tab 60 tablet 1     Sig: Take 1 ta

## 2019-10-09 RX ORDER — ALENDRONATE SODIUM 70 MG/1
70 TABLET ORAL WEEKLY
Qty: 4 TABLET | Refills: 11 | Status: SHIPPED | OUTPATIENT
Start: 2019-10-09 | End: 2020-07-22

## 2019-11-08 RX ORDER — CLONAZEPAM 2 MG/1
2 TABLET ORAL 2 TIMES DAILY PRN
Qty: 60 TABLET | Refills: 1 | Status: SHIPPED | OUTPATIENT
Start: 2019-11-08 | End: 2020-01-05

## 2019-11-08 NOTE — TELEPHONE ENCOUNTER
Requested Prescriptions     Pending Prescriptions Disp Refills   • clonazePAM 2 MG Oral Tab 60 tablet 1     Sig: Take 1 tablet (2 mg total) by mouth 2 (two) times daily as needed for Anxiety.      Last office visit: 5-21-19  Medication last refilled: 9-16-1

## 2019-11-13 ENCOUNTER — APPOINTMENT (OUTPATIENT)
Dept: OTHER | Facility: HOSPITAL | Age: 59
End: 2019-11-13
Attending: EMERGENCY MEDICINE

## 2019-11-20 ENCOUNTER — APPOINTMENT (OUTPATIENT)
Dept: OTHER | Facility: HOSPITAL | Age: 59
End: 2019-11-20
Attending: EMERGENCY MEDICINE

## 2019-11-25 RX ORDER — HYDROCODONE BITARTRATE AND ACETAMINOPHEN 10; 325 MG/1; MG/1
1 TABLET ORAL EVERY 6 HOURS PRN
Qty: 100 TABLET | Refills: 0 | Status: SHIPPED | OUTPATIENT
Start: 2019-11-25 | End: 2020-07-17

## 2019-11-25 NOTE — TELEPHONE ENCOUNTER
Requested Prescriptions     Pending Prescriptions Disp Refills   • HYDROcodone-acetaminophen  MG Oral Tab 100 tablet 0     Sig: Take 1 tablet by mouth every 6 (six) hours as needed for Pain.      Last office visit: 5-21-19  Medication last refilled: 9

## 2019-12-03 RX ORDER — TRAMADOL HYDROCHLORIDE 50 MG/1
100 TABLET ORAL EVERY 6 HOURS PRN
Qty: 90 TABLET | Refills: 2 | Status: SHIPPED | OUTPATIENT
Start: 2019-12-03 | End: 2020-03-16

## 2019-12-03 NOTE — TELEPHONE ENCOUNTER
Requested Prescriptions     Pending Prescriptions Disp Refills   • traMADol HCl 50 MG Oral Tab 90 tablet 2     Sig: Take 2 tablets (100 mg total) by mouth every 6 (six) hours as needed.      Last office visit: 5-21-19  Medication last refilled: 8-26-19 # 90

## 2020-01-06 RX ORDER — CLONAZEPAM 2 MG/1
2 TABLET ORAL 2 TIMES DAILY PRN
Qty: 60 TABLET | Refills: 1 | Status: SHIPPED | OUTPATIENT
Start: 2020-01-06 | End: 2020-01-21 | Stop reason: ALTCHOICE

## 2020-01-06 NOTE — TELEPHONE ENCOUNTER
Requested Prescriptions     Pending Prescriptions Disp Refills   • clonazePAM 2 MG Oral Tab 60 tablet 1     Sig: Take 1 tablet (2 mg total) by mouth 2 (two) times daily as needed for Anxiety.      Last office visit: 5-21-19  Medication last refilled: 11-8-1

## 2020-01-21 NOTE — PROGRESS NOTES
HPI:    Patient ID: Craig Blizzard is a 61year old female. 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 Denies depressive symptoms per se.   Linda Isha ASSESSMENT/PLAN:   Abhijit (generalized anxiety disorder)  (primary encounter diagnosis) change clonazepam to Lorazepam 1 mg g hs prn  s/p left l4-5 laminectomy with l5 sacralized  Insomnia   Zolpidem 10mg    No orders of the defined types were placed in thi

## 2020-02-21 ENCOUNTER — HOSPITAL ENCOUNTER (OUTPATIENT)
Dept: BONE DENSITY | Facility: HOSPITAL | Age: 60
Discharge: HOME | End: 2020-02-21
Attending: INTERNAL MEDICINE
Payer: COMMERCIAL

## 2020-02-21 ENCOUNTER — HOSPITAL ENCOUNTER (OUTPATIENT)
Dept: MAMMOGRAPHY | Facility: HOSPITAL | Age: 60
Discharge: HOME | End: 2020-02-21
Attending: INTERNAL MEDICINE
Payer: COMMERCIAL

## 2020-02-21 DIAGNOSIS — M81.8: ICD-10-CM

## 2020-02-21 DIAGNOSIS — Z12.31 SCREENING MAMMOGRAM, ENCOUNTER FOR: ICD-10-CM

## 2020-02-21 PROCEDURE — 77067 SCR MAMMO BI INCL CAD: CPT | Performed by: INTERNAL MEDICINE

## 2020-02-21 PROCEDURE — 77063 BREAST TOMOSYNTHESIS BI: CPT | Performed by: INTERNAL MEDICINE

## 2020-02-21 PROCEDURE — 77080 DXA BONE DENSITY AXIAL: CPT | Performed by: INTERNAL MEDICINE

## 2020-03-16 RX ORDER — TRAMADOL HYDROCHLORIDE 50 MG/1
100 TABLET ORAL EVERY 6 HOURS PRN
Qty: 90 TABLET | Refills: 2 | Status: SHIPPED | OUTPATIENT
Start: 2020-03-16 | End: 2020-07-28

## 2020-03-16 RX ORDER — ESCITALOPRAM OXALATE 20 MG/1
20 TABLET ORAL DAILY
Qty: 90 TABLET | Refills: 3 | Status: SHIPPED | OUTPATIENT
Start: 2020-03-16 | End: 2020-07-17

## 2020-03-16 NOTE — TELEPHONE ENCOUNTER
Requested Prescriptions     Pending Prescriptions Disp Refills   • escitalopram (LEXAPRO) 20 MG Oral Tab 90 tablet 3     Sig: Take 1 tablet (20 mg total) by mouth daily.    • traMADol HCl 50 MG Oral Tab 90 tablet 2     Sig: Take 2 tablets (100 mg total) by

## 2020-04-06 RX ORDER — HYDROCODONE BITARTRATE AND ACETAMINOPHEN 10; 325 MG/1; MG/1
1 TABLET ORAL EVERY 6 HOURS PRN
Qty: 100 TABLET | Refills: 0 | Status: SHIPPED | OUTPATIENT
Start: 2020-04-06 | End: 2020-06-02

## 2020-05-07 RX ORDER — CLONAZEPAM 2 MG/1
2 TABLET ORAL 2 TIMES DAILY PRN
Qty: 60 TABLET | Refills: 0 | Status: SHIPPED | OUTPATIENT
Start: 2020-05-07 | End: 2020-06-02

## 2020-06-04 ENCOUNTER — PATIENT MESSAGE (OUTPATIENT)
Dept: INTERNAL MEDICINE CLINIC | Facility: CLINIC | Age: 60
End: 2020-06-04

## 2020-06-04 RX ORDER — CLONAZEPAM 2 MG/1
2 TABLET ORAL 2 TIMES DAILY PRN
Qty: 60 TABLET | Refills: 0 | Status: SHIPPED | OUTPATIENT
Start: 2020-06-04 | End: 2020-06-05

## 2020-06-04 RX ORDER — HYDROCODONE BITARTRATE AND ACETAMINOPHEN 10; 325 MG/1; MG/1
1 TABLET ORAL EVERY 6 HOURS PRN
Qty: 100 TABLET | Refills: 0 | Status: SHIPPED | OUTPATIENT
Start: 2020-06-04 | End: 2020-06-05

## 2020-06-05 NOTE — TELEPHONE ENCOUNTER
Patient called as she checked with her pharmacy and they haven't received electronically her Clonazepam refill.

## 2020-06-05 NOTE — TELEPHONE ENCOUNTER
Spoke with pharmacist Junior Arguello, rx was not received yesterday, rx called in, pt was notified   Denied further questions

## 2020-07-01 RX ORDER — CLONAZEPAM 2 MG/1
2 TABLET ORAL 2 TIMES DAILY PRN
Qty: 60 TABLET | Refills: 3 | Status: SHIPPED | OUTPATIENT
Start: 2020-07-01 | End: 2020-10-28

## 2020-07-17 ENCOUNTER — OFFICE VISIT (OUTPATIENT)
Dept: INTERNAL MEDICINE CLINIC | Facility: CLINIC | Age: 60
End: 2020-07-17
Payer: COMMERCIAL

## 2020-07-17 VITALS
TEMPERATURE: 98 F | HEART RATE: 72 BPM | DIASTOLIC BLOOD PRESSURE: 78 MMHG | WEIGHT: 147.19 LBS | BODY MASS INDEX: 23.1 KG/M2 | OXYGEN SATURATION: 97 % | SYSTOLIC BLOOD PRESSURE: 106 MMHG | HEIGHT: 67 IN

## 2020-07-17 DIAGNOSIS — M47.816 LUMBAR SPONDYLOSIS: ICD-10-CM

## 2020-07-17 DIAGNOSIS — F32.1 CURRENT MODERATE EPISODE OF MAJOR DEPRESSIVE DISORDER WITHOUT PRIOR EPISODE (HCC): ICD-10-CM

## 2020-07-17 DIAGNOSIS — Z00.00 WELLNESS EXAMINATION: Primary | ICD-10-CM

## 2020-07-17 DIAGNOSIS — F51.01 PRIMARY INSOMNIA: ICD-10-CM

## 2020-07-17 PROCEDURE — 3008F BODY MASS INDEX DOCD: CPT | Performed by: INTERNAL MEDICINE

## 2020-07-17 PROCEDURE — 3074F SYST BP LT 130 MM HG: CPT | Performed by: INTERNAL MEDICINE

## 2020-07-17 PROCEDURE — 3078F DIAST BP <80 MM HG: CPT | Performed by: INTERNAL MEDICINE

## 2020-07-17 PROCEDURE — 99396 PREV VISIT EST AGE 40-64: CPT | Performed by: INTERNAL MEDICINE

## 2020-07-17 RX ORDER — HYDROCODONE BITARTRATE AND ACETAMINOPHEN 10; 325 MG/1; MG/1
1 TABLET ORAL EVERY 6 HOURS PRN
Qty: 120 TABLET | Refills: 0 | Status: SHIPPED | OUTPATIENT
Start: 2020-07-17 | End: 2020-08-20

## 2020-07-17 RX ORDER — ESCITALOPRAM OXALATE 20 MG/1
TABLET ORAL
Qty: 135 TABLET | Refills: 3 | Status: SHIPPED | OUTPATIENT
Start: 2020-07-17 | End: 2021-03-23

## 2020-07-17 NOTE — PROGRESS NOTES
HPI:    Patient ID: Izabella Bell is a 61year old female. HPI Patient here for an annual physical.  Has had a upswing in lumbar pain since working 12 hr shifts. Has recently resigned from work as an RN.   Has some worsening of depression - has starte tenderness) present. No edema. Neurological: She is alert and oriented to person, place, and time. Skin: No rash noted.    Psychiatric:   Depressed affect              ASSESSMENT/PLAN:   Wellness examination  (primary encounter diagnosis) had mammogram

## 2020-07-20 RX ORDER — ZOLPIDEM TARTRATE 10 MG/1
10 TABLET ORAL NIGHTLY
Qty: 90 TABLET | Refills: 3 | Status: SHIPPED | OUTPATIENT
Start: 2020-07-20 | End: 2021-02-12

## 2020-07-20 NOTE — TELEPHONE ENCOUNTER
Requested Prescriptions     Pending Prescriptions Disp Refills   • Zolpidem Tartrate 10 MG Oral Tab 90 tablet 3     Sig: Take 1 tablet (10 mg total) by mouth nightly.      Last office visit: 7-  Medication last refilled: 1-

## 2020-07-22 RX ORDER — ALENDRONATE SODIUM 70 MG/1
70 TABLET ORAL WEEKLY
Qty: 12 TABLET | Refills: 3 | Status: SHIPPED | OUTPATIENT
Start: 2020-07-22 | End: 2021-05-06

## 2020-07-29 RX ORDER — TRAMADOL HYDROCHLORIDE 50 MG/1
100 TABLET ORAL EVERY 6 HOURS PRN
Qty: 90 TABLET | Refills: 2 | Status: SHIPPED | OUTPATIENT
Start: 2020-07-29 | End: 2020-11-19

## 2020-08-21 RX ORDER — HYDROCODONE BITARTRATE AND ACETAMINOPHEN 10; 325 MG/1; MG/1
1 TABLET ORAL EVERY 6 HOURS PRN
Qty: 120 TABLET | Refills: 0 | Status: SHIPPED | OUTPATIENT
Start: 2020-08-21 | End: 2020-10-13

## 2020-08-21 NOTE — TELEPHONE ENCOUNTER
Requested Prescriptions     Pending Prescriptions Disp Refills   • HYDROcodone-acetaminophen  MG Oral Tab 120 tablet 0     Sig: Take 1 tablet by mouth every 6 (six) hours as needed for Pain.      Last office visit: 7-  Medication last refilled:

## 2020-10-13 RX ORDER — HYDROCODONE BITARTRATE AND ACETAMINOPHEN 10; 325 MG/1; MG/1
1 TABLET ORAL EVERY 6 HOURS PRN
Qty: 120 TABLET | Refills: 0 | Status: SHIPPED | OUTPATIENT
Start: 2020-10-13 | End: 2020-12-03

## 2020-10-19 ENCOUNTER — IMMUNIZATION (OUTPATIENT)
Dept: INTERNAL MEDICINE CLINIC | Facility: CLINIC | Age: 60
End: 2020-10-19
Payer: COMMERCIAL

## 2020-10-19 DIAGNOSIS — Z23 NEED FOR VACCINATION: ICD-10-CM

## 2020-10-19 PROCEDURE — 90471 IMMUNIZATION ADMIN: CPT | Performed by: FAMILY MEDICINE

## 2020-10-19 PROCEDURE — 90686 IIV4 VACC NO PRSV 0.5 ML IM: CPT | Performed by: FAMILY MEDICINE

## 2020-10-29 ENCOUNTER — TELEPHONE (OUTPATIENT)
Dept: INTERNAL MEDICINE CLINIC | Facility: CLINIC | Age: 60
End: 2020-10-29

## 2020-10-29 NOTE — TELEPHONE ENCOUNTER
Patient scheduled T-dap on Tuesday, 11/3 at 1, as upcoming baby in the family coming and it was recommended.

## 2020-10-29 NOTE — TELEPHONE ENCOUNTER
Requested Prescriptions     Pending Prescriptions Disp Refills   • clonazePAM (KLONOPIN) 2 MG Oral Tab 60 tablet 3     Sig: Take 1 tablet (2 mg total) by mouth 2 (two) times daily as needed for Anxiety.      Last office visit: 7-  Medication last ref

## 2020-10-30 RX ORDER — CLONAZEPAM 2 MG/1
2 TABLET ORAL 2 TIMES DAILY PRN
Qty: 60 TABLET | Refills: 3 | Status: SHIPPED | OUTPATIENT
Start: 2020-10-30 | End: 2021-02-25

## 2020-11-03 ENCOUNTER — NURSE ONLY (OUTPATIENT)
Dept: INTERNAL MEDICINE CLINIC | Facility: CLINIC | Age: 60
End: 2020-11-03
Payer: COMMERCIAL

## 2020-11-03 PROCEDURE — 90715 TDAP VACCINE 7 YRS/> IM: CPT | Performed by: INTERNAL MEDICINE

## 2020-11-03 PROCEDURE — 90471 IMMUNIZATION ADMIN: CPT | Performed by: INTERNAL MEDICINE

## 2020-11-19 NOTE — TELEPHONE ENCOUNTER
Requested Prescriptions     Pending Prescriptions Disp Refills   • traMADol HCl 50 MG Oral Tab 90 tablet 2     Sig: Take 2 tablets (100 mg total) by mouth every 6 (six) hours as needed.      Last office visit: 7-  Medication last refilled: 7-

## 2020-11-20 RX ORDER — TRAMADOL HYDROCHLORIDE 50 MG/1
100 TABLET ORAL EVERY 6 HOURS PRN
Qty: 90 TABLET | Refills: 2 | Status: SHIPPED | OUTPATIENT
Start: 2020-11-20 | End: 2021-03-23

## 2020-12-04 RX ORDER — HYDROCODONE BITARTRATE AND ACETAMINOPHEN 10; 325 MG/1; MG/1
1 TABLET ORAL EVERY 6 HOURS PRN
Qty: 120 TABLET | Refills: 0 | Status: SHIPPED | OUTPATIENT
Start: 2020-12-04 | End: 2021-01-22

## 2021-01-22 DIAGNOSIS — Z12.31 SCREENING MAMMOGRAM, ENCOUNTER FOR: Primary | ICD-10-CM

## 2021-01-22 RX ORDER — HYDROCODONE BITARTRATE AND ACETAMINOPHEN 10; 325 MG/1; MG/1
1 TABLET ORAL EVERY 6 HOURS PRN
Qty: 120 TABLET | Refills: 0 | Status: SHIPPED | OUTPATIENT
Start: 2021-01-22 | End: 2021-09-14

## 2021-01-22 RX ORDER — HYDROCODONE BITARTRATE AND ACETAMINOPHEN 10; 325 MG/1; MG/1
1 TABLET ORAL EVERY 6 HOURS PRN
Qty: 120 TABLET | Refills: 0 | OUTPATIENT
Start: 2021-01-22

## 2021-02-11 RX ORDER — ZOLPIDEM TARTRATE 10 MG/1
10 TABLET ORAL NIGHTLY
Qty: 90 TABLET | Refills: 3 | OUTPATIENT
Start: 2021-02-11 | End: 2022-02-06

## 2021-02-12 RX ORDER — ZOLPIDEM TARTRATE 10 MG/1
10 TABLET ORAL NIGHTLY
Qty: 90 TABLET | Refills: 3 | OUTPATIENT
Start: 2021-02-12 | End: 2022-02-07

## 2021-02-12 RX ORDER — ZOLPIDEM TARTRATE 10 MG/1
10 TABLET ORAL NIGHTLY
Qty: 90 TABLET | Refills: 0 | Status: SHIPPED | OUTPATIENT
Start: 2021-02-12 | End: 2022-02-07

## 2021-02-12 RX ORDER — ZOLPIDEM TARTRATE 10 MG/1
TABLET ORAL
Qty: 30 TABLET | Refills: 0 | Status: SHIPPED | OUTPATIENT
Start: 2021-02-12 | End: 2021-03-03

## 2021-02-12 NOTE — TELEPHONE ENCOUNTER
Patient states pharmacy does not have script. Does it need a call in or was it faxed? Please advise.

## 2021-02-17 NOTE — TELEPHONE ENCOUNTER
From: Adriana Boyce  Sent: 12/15/2017 8:30 AM CST  Subject: Medication Renewal Request    Adriana Boyce would like a refill of the following medications:     ClonazePAM 2 MG Oral Tab Barrett Presley DO]    Preferred pharmacy: [facility] #[id number] - Moh[place] Demarco - [address] [facility]  AT [address] [place] [address], [phone], [phone]
100

## 2021-02-26 ENCOUNTER — TELEPHONE (OUTPATIENT)
Dept: INTERNAL MEDICINE CLINIC | Facility: CLINIC | Age: 61
End: 2021-02-26

## 2021-02-26 RX ORDER — CLONAZEPAM 2 MG/1
2 TABLET ORAL 2 TIMES DAILY PRN
Qty: 60 TABLET | Refills: 0 | Status: SHIPPED | OUTPATIENT
Start: 2021-02-26 | End: 2021-03-23

## 2021-02-26 NOTE — TELEPHONE ENCOUNTER
Informed patient PCP needs to see patient before renewing ambien and klonopin. Erica Trevino LOV 7/17/20.   Appt made for 3/03/212 at 10:30am

## 2021-03-03 RX ORDER — ZOLPIDEM TARTRATE 10 MG/1
10 TABLET ORAL NIGHTLY
Qty: 30 TABLET | Refills: 0 | Status: SHIPPED | OUTPATIENT
Start: 2021-03-03 | End: 2021-03-23

## 2021-04-01 ENCOUNTER — HOSPITAL ENCOUNTER (OUTPATIENT)
Dept: MAMMOGRAPHY | Facility: HOSPITAL | Age: 61
Discharge: HOME | End: 2021-04-01
Attending: INTERNAL MEDICINE
Payer: COMMERCIAL

## 2021-04-01 DIAGNOSIS — Z12.31 SCREENING MAMMOGRAM, ENCOUNTER FOR: ICD-10-CM

## 2021-04-01 PROCEDURE — 77067 SCR MAMMO BI INCL CAD: CPT | Performed by: INTERNAL MEDICINE

## 2021-04-01 PROCEDURE — 77063 BREAST TOMOSYNTHESIS BI: CPT | Performed by: INTERNAL MEDICINE

## 2021-05-06 RX ORDER — ALENDRONATE SODIUM 70 MG/1
TABLET ORAL
Qty: 12 TABLET | Refills: 3 | Status: SHIPPED | OUTPATIENT
Start: 2021-05-06

## 2021-05-06 NOTE — TELEPHONE ENCOUNTER
Requested Prescriptions     Pending Prescriptions Disp Refills   • ALENDRONATE 70 MG Oral Tab [Pharmacy Med Name: ALENDRONATE SODIUM 70 MG TAB] 12 tablet 3     Sig: TAKE 1 TABLET BY MOUTH ONE TIME PER WEEK     Last office visit: 3-23-21  Medication last re

## 2021-06-22 DIAGNOSIS — M96.1 LUMBAR POST-LAMINECTOMY SYNDROME: ICD-10-CM

## 2021-06-22 RX ORDER — HYDROCODONE BITARTRATE AND ACETAMINOPHEN 10; 325 MG/1; MG/1
1-2 TABLET ORAL EVERY 8 HOURS PRN
Qty: 120 TABLET | Refills: 0 | Status: SHIPPED | OUTPATIENT
Start: 2021-06-22 | End: 2021-07-20

## 2021-07-20 ENCOUNTER — OFFICE VISIT (OUTPATIENT)
Dept: INTERNAL MEDICINE CLINIC | Facility: CLINIC | Age: 61
End: 2021-07-20
Payer: COMMERCIAL

## 2021-07-20 VITALS
DIASTOLIC BLOOD PRESSURE: 78 MMHG | HEIGHT: 67 IN | BODY MASS INDEX: 24.01 KG/M2 | HEART RATE: 87 BPM | SYSTOLIC BLOOD PRESSURE: 116 MMHG | WEIGHT: 153 LBS | OXYGEN SATURATION: 97 %

## 2021-07-20 DIAGNOSIS — Z00.00 WELLNESS EXAMINATION: Primary | ICD-10-CM

## 2021-07-20 DIAGNOSIS — F32.1 CURRENT MODERATE EPISODE OF MAJOR DEPRESSIVE DISORDER WITHOUT PRIOR EPISODE (HCC): ICD-10-CM

## 2021-07-20 DIAGNOSIS — M96.1 LUMBAR POST-LAMINECTOMY SYNDROME: ICD-10-CM

## 2021-07-20 PROCEDURE — 3008F BODY MASS INDEX DOCD: CPT | Performed by: INTERNAL MEDICINE

## 2021-07-20 PROCEDURE — 90750 HZV VACC RECOMBINANT IM: CPT | Performed by: INTERNAL MEDICINE

## 2021-07-20 PROCEDURE — 90471 IMMUNIZATION ADMIN: CPT | Performed by: INTERNAL MEDICINE

## 2021-07-20 PROCEDURE — 3078F DIAST BP <80 MM HG: CPT | Performed by: INTERNAL MEDICINE

## 2021-07-20 PROCEDURE — 3074F SYST BP LT 130 MM HG: CPT | Performed by: INTERNAL MEDICINE

## 2021-07-20 PROCEDURE — 88175 CYTOPATH C/V AUTO FLUID REDO: CPT | Performed by: INTERNAL MEDICINE

## 2021-07-20 PROCEDURE — 99396 PREV VISIT EST AGE 40-64: CPT | Performed by: INTERNAL MEDICINE

## 2021-07-20 RX ORDER — HYDROCODONE BITARTRATE AND ACETAMINOPHEN 10; 325 MG/1; MG/1
1-2 TABLET ORAL EVERY 8 HOURS PRN
Qty: 120 TABLET | Refills: 0 | Status: SHIPPED | OUTPATIENT
Start: 2021-07-20 | End: 2021-08-19

## 2021-07-20 NOTE — PROGRESS NOTES
HPI/Subjective:   Patient ID: Enrike Chapa is a 61year old female. HPI Pt here for a wellness  Exam and fu on chronic pain linked to laminectomy depression and insomnia. Is needing Norco for chronic pain. Depression seems to be worse lately.     Hist membranes are moist.   Eyes:      Extraocular Movements: Extraocular movements intact. Pupils: Pupils are equal, round, and reactive to light. Cardiovascular:      Rate and Rhythm: Normal rate and regular rhythm.    Pulmonary:      Effort: Pulmonary

## 2021-07-26 LAB
LAST PAP RESULT: NORMAL
PAP HISTORY (OTHER THAN LAST PAP): NORMAL

## 2021-08-23 RX ORDER — TRAMADOL HYDROCHLORIDE 50 MG/1
100 TABLET ORAL EVERY 6 HOURS PRN
Qty: 90 TABLET | Refills: 2 | OUTPATIENT
Start: 2021-08-23

## 2021-08-24 RX ORDER — TRAMADOL HYDROCHLORIDE 50 MG/1
100 TABLET ORAL EVERY 6 HOURS PRN
Qty: 90 TABLET | Refills: 0 | Status: SHIPPED | OUTPATIENT
Start: 2021-08-24 | End: 2021-09-30

## 2021-08-24 RX ORDER — TRAMADOL HYDROCHLORIDE 50 MG/1
TABLET ORAL
Qty: 90 TABLET | Refills: 2 | OUTPATIENT
Start: 2021-08-24

## 2021-08-31 ENCOUNTER — NURSE ONLY (OUTPATIENT)
Dept: INTERNAL MEDICINE CLINIC | Facility: CLINIC | Age: 61
End: 2021-08-31
Payer: COMMERCIAL

## 2021-08-31 DIAGNOSIS — Z00.00 WELLNESS EXAMINATION: ICD-10-CM

## 2021-08-31 DIAGNOSIS — Z23 IMMUNIZATION DUE: Primary | ICD-10-CM

## 2021-08-31 LAB
ALBUMIN SERPL-MCNC: 3.9 G/DL (ref 3.4–5)
ALBUMIN/GLOB SERPL: 1 {RATIO} (ref 1–2)
ALP LIVER SERPL-CCNC: 57 U/L
ALT SERPL-CCNC: 18 U/L
ANION GAP SERPL CALC-SCNC: 3 MMOL/L (ref 0–18)
AST SERPL-CCNC: 13 U/L (ref 15–37)
BASOPHILS # BLD AUTO: 0.06 X10(3) UL (ref 0–0.2)
BASOPHILS NFR BLD AUTO: 1.3 %
BILIRUB SERPL-MCNC: 0.5 MG/DL (ref 0.1–2)
BUN BLD-MCNC: 7 MG/DL (ref 7–18)
BUN/CREAT SERPL: 8.3 (ref 10–20)
CALCIUM BLD-MCNC: 9.1 MG/DL (ref 8.5–10.1)
CHLORIDE SERPL-SCNC: 108 MMOL/L (ref 98–112)
CHOLEST SMN-MCNC: 223 MG/DL (ref ?–200)
CO2 SERPL-SCNC: 30 MMOL/L (ref 21–32)
CREAT BLD-MCNC: 0.84 MG/DL
DEPRECATED RDW RBC AUTO: 40.1 FL (ref 35.1–46.3)
EOSINOPHIL # BLD AUTO: 0.11 X10(3) UL (ref 0–0.7)
EOSINOPHIL NFR BLD AUTO: 2.5 %
ERYTHROCYTE [DISTWIDTH] IN BLOOD BY AUTOMATED COUNT: 11.6 % (ref 11–15)
GLOBULIN PLAS-MCNC: 3.9 G/DL (ref 2.8–4.4)
GLUCOSE BLD-MCNC: 90 MG/DL (ref 70–99)
HCT VFR BLD AUTO: 47.4 %
HDLC SERPL-MCNC: 54 MG/DL (ref 40–59)
HGB BLD-MCNC: 15.3 G/DL
IMM GRANULOCYTES # BLD AUTO: 0.01 X10(3) UL (ref 0–1)
IMM GRANULOCYTES NFR BLD: 0.2 %
LDLC SERPL CALC-MCNC: 139 MG/DL (ref ?–100)
LYMPHOCYTES # BLD AUTO: 1.65 X10(3) UL (ref 1–4)
LYMPHOCYTES NFR BLD AUTO: 37 %
M PROTEIN MFR SERPL ELPH: 7.8 G/DL (ref 6.4–8.2)
MCH RBC QN AUTO: 30.6 PG (ref 26–34)
MCHC RBC AUTO-ENTMCNC: 32.3 G/DL (ref 31–37)
MCV RBC AUTO: 94.8 FL
MONOCYTES # BLD AUTO: 0.43 X10(3) UL (ref 0.1–1)
MONOCYTES NFR BLD AUTO: 9.6 %
NEUTROPHILS # BLD AUTO: 2.2 X10 (3) UL (ref 1.5–7.7)
NEUTROPHILS # BLD AUTO: 2.2 X10(3) UL (ref 1.5–7.7)
NEUTROPHILS NFR BLD AUTO: 49.4 %
NONHDLC SERPL-MCNC: 169 MG/DL (ref ?–130)
OSMOLALITY SERPL CALC.SUM OF ELEC: 290 MOSM/KG (ref 275–295)
PATIENT FASTING Y/N/NP: YES
PATIENT FASTING Y/N/NP: YES
PLATELET # BLD AUTO: 279 10(3)UL (ref 150–450)
POTASSIUM SERPL-SCNC: 4.2 MMOL/L (ref 3.5–5.1)
RBC # BLD AUTO: 5 X10(6)UL
SODIUM SERPL-SCNC: 141 MMOL/L (ref 136–145)
T4 FREE SERPL-MCNC: 1.2 NG/DL (ref 0.8–1.7)
TRIGL SERPL-MCNC: 170 MG/DL (ref 30–149)
TSI SER-ACNC: 1.13 MIU/ML (ref 0.36–3.74)
VLDLC SERPL CALC-MCNC: 31 MG/DL (ref 0–30)
WBC # BLD AUTO: 4.5 X10(3) UL (ref 4–11)

## 2021-08-31 PROCEDURE — 80050 GENERAL HEALTH PANEL: CPT | Performed by: INTERNAL MEDICINE

## 2021-08-31 PROCEDURE — 84439 ASSAY OF FREE THYROXINE: CPT | Performed by: INTERNAL MEDICINE

## 2021-08-31 PROCEDURE — 90471 IMMUNIZATION ADMIN: CPT | Performed by: INTERNAL MEDICINE

## 2021-08-31 PROCEDURE — 90750 HZV VACC RECOMBINANT IM: CPT | Performed by: INTERNAL MEDICINE

## 2021-08-31 PROCEDURE — 80061 LIPID PANEL: CPT | Performed by: INTERNAL MEDICINE

## 2021-08-31 NOTE — PROGRESS NOTES
Pt presented to clinic today for blood draw. Per physician able to draw orders. Orders  documented within chart. Pt tolerated lab draw well.  verified.   Orders drawn include: TSH, Lipid, CBC, and CMP  Site of draw: right arm       Patient presents for 2

## 2021-09-10 RX ORDER — CLONAZEPAM 2 MG/1
TABLET ORAL
Qty: 60 TABLET | Refills: 0 | Status: SHIPPED | OUTPATIENT
Start: 2021-09-10 | End: 2021-10-05

## 2021-09-16 RX ORDER — HYDROCODONE BITARTRATE AND ACETAMINOPHEN 10; 325 MG/1; MG/1
1 TABLET ORAL EVERY 6 HOURS PRN
Qty: 120 TABLET | Refills: 0 | Status: SHIPPED | OUTPATIENT
Start: 2021-09-16 | End: 2021-12-28

## 2021-09-16 RX ORDER — ZOLPIDEM TARTRATE 10 MG/1
10 TABLET ORAL NIGHTLY
Qty: 30 TABLET | Refills: 5 | Status: SHIPPED | OUTPATIENT
Start: 2021-09-16

## 2021-09-16 RX ORDER — ESCITALOPRAM OXALATE 20 MG/1
TABLET ORAL
Qty: 135 TABLET | Refills: 3 | Status: SHIPPED | OUTPATIENT
Start: 2021-09-16

## 2021-09-17 RX ORDER — CLONAZEPAM 2 MG/1
2 TABLET ORAL 2 TIMES DAILY PRN
Qty: 60 TABLET | Refills: 5 | OUTPATIENT
Start: 2021-09-17

## 2021-09-30 RX ORDER — TRAMADOL HYDROCHLORIDE 50 MG/1
100 TABLET ORAL EVERY 6 HOURS PRN
Qty: 90 TABLET | Refills: 0 | Status: SHIPPED | OUTPATIENT
Start: 2021-09-30 | End: 2021-11-02

## 2021-10-05 NOTE — TELEPHONE ENCOUNTER
A refill request was received for:  Requested Prescriptions     Pending Prescriptions Disp Refills   • clonazePAM 2 MG Oral Tab 60 tablet 0     Sig: Take 1 tablet (2 mg total) by mouth 2 (two) times daily as needed for Anxiety.      Last refill date: 9/10/2

## 2021-10-07 RX ORDER — CLONAZEPAM 2 MG/1
2 TABLET ORAL 2 TIMES DAILY PRN
Qty: 60 TABLET | Refills: 0 | Status: SHIPPED | OUTPATIENT
Start: 2021-10-07 | End: 2021-11-02

## 2021-11-04 RX ORDER — TRAMADOL HYDROCHLORIDE 50 MG/1
100 TABLET ORAL EVERY 6 HOURS PRN
Qty: 90 TABLET | Refills: 0 | Status: SHIPPED | OUTPATIENT
Start: 2021-11-04 | End: 2021-12-02

## 2021-11-04 RX ORDER — CLONAZEPAM 2 MG/1
2 TABLET ORAL 2 TIMES DAILY PRN
Qty: 60 TABLET | Refills: 0 | Status: SHIPPED | OUTPATIENT
Start: 2021-11-04 | End: 2021-12-02

## 2021-12-01 NOTE — TELEPHONE ENCOUNTER
Requested Prescriptions     Pending Prescriptions Disp Refills   • TRAMADOL 50 MG Oral Tab [Pharmacy Med Name: TRAMADOL HCL 50 MG TABLET] 90 tablet 0     Sig: TAKE 2 TABLETS BY MOUTH EVERY 6 HOURS AS NEEDED.    • CLONAZEPAM 2 MG Oral Tab [Pharmacy Med Name:

## 2021-12-02 RX ORDER — CLONAZEPAM 2 MG/1
TABLET ORAL
Qty: 60 TABLET | Refills: 0 | Status: SHIPPED | OUTPATIENT
Start: 2021-12-02 | End: 2021-12-28

## 2021-12-02 RX ORDER — TRAMADOL HYDROCHLORIDE 50 MG/1
TABLET ORAL
Qty: 90 TABLET | Refills: 0 | Status: SHIPPED | OUTPATIENT
Start: 2021-12-02 | End: 2022-01-31

## 2021-12-03 RX ORDER — TRAMADOL HYDROCHLORIDE 50 MG/1
100 TABLET ORAL EVERY 6 HOURS PRN
Qty: 90 TABLET | Refills: 0 | OUTPATIENT
Start: 2021-12-03

## 2021-12-03 RX ORDER — CLONAZEPAM 2 MG/1
2 TABLET ORAL 2 TIMES DAILY PRN
Qty: 60 TABLET | Refills: 0 | OUTPATIENT
Start: 2021-12-03

## 2021-12-23 ENCOUNTER — OFFICE VISIT (OUTPATIENT)
Dept: FAMILY MEDICINE CLINIC | Facility: CLINIC | Age: 61
End: 2021-12-23
Payer: COMMERCIAL

## 2021-12-23 VITALS
WEIGHT: 132.63 LBS | BODY MASS INDEX: 20.82 KG/M2 | HEART RATE: 68 BPM | DIASTOLIC BLOOD PRESSURE: 66 MMHG | OXYGEN SATURATION: 97 % | TEMPERATURE: 98 F | SYSTOLIC BLOOD PRESSURE: 98 MMHG | HEIGHT: 67 IN

## 2021-12-23 DIAGNOSIS — R39.9 UTI SYMPTOMS: Primary | ICD-10-CM

## 2021-12-23 PROCEDURE — 87086 URINE CULTURE/COLONY COUNT: CPT | Performed by: PHYSICIAN ASSISTANT

## 2021-12-23 PROCEDURE — 99213 OFFICE O/P EST LOW 20 MIN: CPT | Performed by: PHYSICIAN ASSISTANT

## 2021-12-23 PROCEDURE — 3074F SYST BP LT 130 MM HG: CPT | Performed by: PHYSICIAN ASSISTANT

## 2021-12-23 PROCEDURE — 3078F DIAST BP <80 MM HG: CPT | Performed by: PHYSICIAN ASSISTANT

## 2021-12-23 PROCEDURE — 81003 URINALYSIS AUTO W/O SCOPE: CPT | Performed by: PHYSICIAN ASSISTANT

## 2021-12-23 PROCEDURE — 3008F BODY MASS INDEX DOCD: CPT | Performed by: PHYSICIAN ASSISTANT

## 2021-12-23 RX ORDER — CEPHALEXIN 500 MG/1
500 CAPSULE ORAL 3 TIMES DAILY
Qty: 21 CAPSULE | Refills: 0 | Status: SHIPPED | OUTPATIENT
Start: 2021-12-23 | End: 2021-12-30

## 2021-12-23 NOTE — PROGRESS NOTES
CHIEF COMPLAINT:   Patient presents with:  UTI      HPI:   Katia Yu is a 64year old female who presents with symptoms of UTI. The patient reports urinary frequency, urgency, and dysuria for the last 2 days.  Symptoms have been consistent since ons foraminal bulging disc 6/17/2014   • L4-5 left mild foraminal stenosis 6/17/2014   • L4-5 small central HNP 6/17/2014   • L4-L5 disc bulge     s/p laminectomy   • Left low back pain 6/17/2014   • left S1 radiculopathy 6/17/2014   • Lower back pain    • Tracey year old female presents with UTI symptoms. ASSESSMENT:  Uti symptoms  (primary encounter diagnosis)    PLAN: Meds as listed below.   Comfort measures as described in Patient Instructions    Meds & Refills for this Visit:  Requested Prescriptions     Sig help flush bacteria out of your body. · Empty your bladder. Always empty your bladder when you feel the urge to pee. And always pee before going to sleep. Urine that stays in your bladder can lead to infection. Try to pee before and after sex as well.   · rights reserved. This information is not intended as a substitute for professional medical care. Always follow your healthcare professional's instructions. The patient indicates understanding of these issues and agrees to the plan.   The patien

## 2021-12-23 NOTE — PATIENT INSTRUCTIONS
1. Keflex  2. Urine culture sent  3. Increase fluids  4. Follow up with PCP  5. If worsening symptoms seek treatment      Urinary Tract Infections in Women  Urinary tract infections (UTIs) are most often caused by bacteria.  These bacteria enter the urina other forms of birth control instead. For women who tend to get UTIs after sex, a low-dose of a preventive antibiotic may be used. Be sure to discuss this option with your healthcare provider.   · Try holistic supplements such as cranberry tablets and D-man

## 2021-12-27 RX ORDER — CLONAZEPAM 2 MG/1
2 TABLET ORAL 2 TIMES DAILY PRN
Qty: 60 TABLET | Refills: 0 | Status: CANCELLED | OUTPATIENT
Start: 2021-12-27

## 2021-12-28 RX ORDER — HYDROCODONE BITARTRATE AND ACETAMINOPHEN 10; 325 MG/1; MG/1
1 TABLET ORAL EVERY 6 HOURS PRN
Qty: 120 TABLET | Refills: 0 | Status: SHIPPED | OUTPATIENT
Start: 2021-12-28

## 2021-12-28 NOTE — TELEPHONE ENCOUNTER
A refill request was received for:  Requested Prescriptions     Pending Prescriptions Disp Refills   • HYDROcodone-acetaminophen  MG Oral Tab 120 tablet 0     Sig: Take 1 tablet by mouth every 6 (six) hours as needed for Pain.      Last refill date: 5

## 2022-01-31 RX ORDER — TRAMADOL HYDROCHLORIDE 50 MG/1
100 TABLET ORAL EVERY 6 HOURS PRN
Qty: 90 TABLET | Refills: 0 | Status: SHIPPED | OUTPATIENT
Start: 2022-01-31

## 2022-01-31 NOTE — TELEPHONE ENCOUNTER
Requested Prescriptions     Pending Prescriptions Disp Refills   • traMADol 50 MG Oral Tab 90 tablet 0     Sig: Take 2 tablets (100 mg total) by mouth every 6 (six) hours as needed.      Last office visit: 7-20-21  Medication last refilled: 12-2-21

## 2022-02-08 RX ORDER — HYDROCODONE BITARTRATE AND ACETAMINOPHEN 10; 325 MG/1; MG/1
1 TABLET ORAL EVERY 6 HOURS PRN
Qty: 120 TABLET | Refills: 0 | Status: SHIPPED | OUTPATIENT
Start: 2022-02-08

## 2022-03-14 RX ORDER — ZOLPIDEM TARTRATE 10 MG/1
10 TABLET ORAL NIGHTLY
Qty: 30 TABLET | Refills: 5 | Status: SHIPPED | OUTPATIENT
Start: 2022-03-14

## 2022-03-14 RX ORDER — TRAMADOL HYDROCHLORIDE 50 MG/1
100 TABLET ORAL EVERY 6 HOURS PRN
Qty: 90 TABLET | Refills: 0 | Status: SHIPPED | OUTPATIENT
Start: 2022-03-14

## 2022-04-05 RX ORDER — HYDROCODONE BITARTRATE AND ACETAMINOPHEN 10; 325 MG/1; MG/1
1 TABLET ORAL EVERY 6 HOURS PRN
Qty: 120 TABLET | Refills: 0 | Status: SHIPPED | OUTPATIENT
Start: 2022-04-05

## 2022-05-12 RX ORDER — ALENDRONATE SODIUM 70 MG/1
TABLET ORAL
Qty: 12 TABLET | Refills: 3 | Status: SHIPPED | OUTPATIENT
Start: 2022-05-12

## 2022-05-17 RX ORDER — HYDROCODONE BITARTRATE AND ACETAMINOPHEN 10; 325 MG/1; MG/1
1 TABLET ORAL EVERY 6 HOURS PRN
Qty: 120 TABLET | Refills: 0 | Status: SHIPPED | OUTPATIENT
Start: 2022-05-17

## 2022-06-02 RX ORDER — CLONAZEPAM 2 MG/1
2 TABLET ORAL 2 TIMES DAILY PRN
Qty: 60 TABLET | Refills: 5 | OUTPATIENT
Start: 2022-06-02

## 2022-06-02 RX ORDER — ALENDRONATE SODIUM 70 MG/1
70 TABLET ORAL WEEKLY
Qty: 12 TABLET | Refills: 3 | OUTPATIENT
Start: 2022-06-02

## 2022-06-02 RX ORDER — TRAMADOL HYDROCHLORIDE 50 MG/1
100 TABLET ORAL EVERY 6 HOURS PRN
Qty: 90 TABLET | Refills: 0 | Status: SHIPPED | OUTPATIENT
Start: 2022-06-02

## 2022-06-10 RX ORDER — CLONAZEPAM 2 MG/1
2 TABLET ORAL 2 TIMES DAILY PRN
Qty: 60 TABLET | Refills: 5 | Status: SHIPPED | OUTPATIENT
Start: 2022-06-10

## 2022-06-10 RX ORDER — ALENDRONATE SODIUM 70 MG/1
70 TABLET ORAL WEEKLY
Qty: 12 TABLET | Refills: 3 | Status: SHIPPED | OUTPATIENT
Start: 2022-06-10

## 2022-06-20 RX ORDER — HYDROCODONE BITARTRATE AND ACETAMINOPHEN 10; 325 MG/1; MG/1
1 TABLET ORAL EVERY 6 HOURS PRN
Qty: 120 TABLET | Refills: 0 | Status: SHIPPED | OUTPATIENT
Start: 2022-06-20

## 2022-07-08 RX ORDER — TRAMADOL HYDROCHLORIDE 50 MG/1
100 TABLET ORAL EVERY 6 HOURS PRN
Qty: 90 TABLET | Refills: 0 | OUTPATIENT
Start: 2022-07-08

## 2022-07-14 RX ORDER — TRAMADOL HYDROCHLORIDE 50 MG/1
100 TABLET ORAL EVERY 6 HOURS PRN
Qty: 90 TABLET | Refills: 0 | Status: SHIPPED | OUTPATIENT
Start: 2022-07-14

## 2022-07-29 ENCOUNTER — OFFICE VISIT (OUTPATIENT)
Dept: INTERNAL MEDICINE CLINIC | Facility: CLINIC | Age: 62
End: 2022-07-29
Payer: COMMERCIAL

## 2022-07-29 VITALS
WEIGHT: 128 LBS | HEIGHT: 67 IN | BODY MASS INDEX: 20.09 KG/M2 | SYSTOLIC BLOOD PRESSURE: 120 MMHG | DIASTOLIC BLOOD PRESSURE: 70 MMHG

## 2022-07-29 DIAGNOSIS — M96.1 LUMBAR POST-LAMINECTOMY SYNDROME: ICD-10-CM

## 2022-07-29 DIAGNOSIS — Z00.00 WELLNESS EXAMINATION: Primary | ICD-10-CM

## 2022-07-29 DIAGNOSIS — F32.2 CURRENT SEVERE EPISODE OF MAJOR DEPRESSIVE DISORDER WITHOUT PSYCHOTIC FEATURES WITHOUT PRIOR EPISODE (HCC): ICD-10-CM

## 2022-07-29 DIAGNOSIS — F51.01 PRIMARY INSOMNIA: ICD-10-CM

## 2022-07-29 PROCEDURE — 3008F BODY MASS INDEX DOCD: CPT | Performed by: INTERNAL MEDICINE

## 2022-07-29 PROCEDURE — 3078F DIAST BP <80 MM HG: CPT | Performed by: INTERNAL MEDICINE

## 2022-07-29 PROCEDURE — 99396 PREV VISIT EST AGE 40-64: CPT | Performed by: INTERNAL MEDICINE

## 2022-07-29 PROCEDURE — 3074F SYST BP LT 130 MM HG: CPT | Performed by: INTERNAL MEDICINE

## 2022-07-29 RX ORDER — NALOXONE HYDROCHLORIDE 4 MG/.1ML
4 SPRAY NASAL AS NEEDED
Qty: 1 KIT | Refills: 0 | Status: SHIPPED | OUTPATIENT
Start: 2022-07-29

## 2022-07-29 RX ORDER — HYDROCODONE BITARTRATE AND ACETAMINOPHEN 10; 325 MG/1; MG/1
1 TABLET ORAL EVERY 4 HOURS PRN
Qty: 120 TABLET | Refills: 0 | Status: SHIPPED | OUTPATIENT
Start: 2022-09-29 | End: 2022-10-29

## 2022-07-29 RX ORDER — HYDROCODONE BITARTRATE AND ACETAMINOPHEN 10; 325 MG/1; MG/1
1 TABLET ORAL EVERY 4 HOURS PRN
Qty: 120 TABLET | Refills: 0 | Status: SHIPPED | OUTPATIENT
Start: 2022-08-29 | End: 2022-09-28

## 2022-07-29 RX ORDER — HYDROCODONE BITARTRATE AND ACETAMINOPHEN 10; 325 MG/1; MG/1
1 TABLET ORAL EVERY 4 HOURS PRN
Qty: 120 TABLET | Refills: 0 | Status: SHIPPED | OUTPATIENT
Start: 2022-07-29 | End: 2022-08-28

## 2022-08-23 RX ORDER — TRAMADOL HYDROCHLORIDE 50 MG/1
100 TABLET ORAL EVERY 6 HOURS PRN
Qty: 90 TABLET | Refills: 0 | Status: SHIPPED | OUTPATIENT
Start: 2022-08-23

## 2022-09-12 RX ORDER — HYDROCODONE BITARTRATE AND ACETAMINOPHEN 10; 325 MG/1; MG/1
1 TABLET ORAL EVERY 4 HOURS PRN
Qty: 120 TABLET | Refills: 0 | Status: SHIPPED | OUTPATIENT
Start: 2022-09-12 | End: 2022-10-12

## 2022-09-15 RX ORDER — ZOLPIDEM TARTRATE 10 MG/1
10 TABLET ORAL NIGHTLY
Qty: 30 TABLET | Refills: 5 | Status: SHIPPED | OUTPATIENT
Start: 2022-09-15

## 2022-09-28 RX ORDER — TRAMADOL HYDROCHLORIDE 50 MG/1
100 TABLET ORAL EVERY 6 HOURS PRN
Qty: 90 TABLET | Refills: 0 | Status: CANCELLED | OUTPATIENT
Start: 2022-09-28

## 2022-11-18 RX ORDER — HYDROCODONE BITARTRATE AND ACETAMINOPHEN 10; 325 MG/1; MG/1
1 TABLET ORAL EVERY 6 HOURS PRN
Qty: 120 TABLET | Refills: 0 | Status: SHIPPED | OUTPATIENT
Start: 2022-11-18

## 2022-11-23 ENCOUNTER — HOSPITAL ENCOUNTER (OUTPATIENT)
Dept: MAMMOGRAPHY | Age: 62
Discharge: HOME | End: 2022-11-23
Attending: INTERNAL MEDICINE
Payer: COMMERCIAL

## 2022-11-23 ENCOUNTER — HOSPITAL ENCOUNTER (OUTPATIENT)
Dept: BONE DENSITY | Age: 62
Discharge: HOME | End: 2022-11-23
Attending: INTERNAL MEDICINE
Payer: COMMERCIAL

## 2022-11-23 DIAGNOSIS — Z12.31 ENCOUNTER FOR SCREENING MAMMOGRAM FOR MALIGNANT NEOPLASM OF BREAST: ICD-10-CM

## 2022-11-23 DIAGNOSIS — M81.8 OSTEOPOROSIS OF DISUSE: ICD-10-CM

## 2022-11-23 PROCEDURE — 77080 DXA BONE DENSITY AXIAL: CPT | Performed by: INTERNAL MEDICINE

## 2022-11-23 PROCEDURE — 77063 BREAST TOMOSYNTHESIS BI: CPT | Performed by: INTERNAL MEDICINE

## 2022-11-23 PROCEDURE — 77067 SCR MAMMO BI INCL CAD: CPT | Performed by: INTERNAL MEDICINE

## 2022-12-09 RX ORDER — CLONAZEPAM 2 MG/1
2 TABLET ORAL 2 TIMES DAILY PRN
Qty: 60 TABLET | Refills: 5 | Status: SHIPPED | OUTPATIENT
Start: 2022-12-09

## 2022-12-21 RX ORDER — HYDROCODONE BITARTRATE AND ACETAMINOPHEN 10; 325 MG/1; MG/1
1 TABLET ORAL EVERY 6 HOURS PRN
Qty: 120 TABLET | Refills: 0 | OUTPATIENT
Start: 2022-12-21

## 2023-02-02 ENCOUNTER — OFFICE VISIT (OUTPATIENT)
Dept: INTERNAL MEDICINE CLINIC | Facility: CLINIC | Age: 63
End: 2023-02-02
Payer: COMMERCIAL

## 2023-02-02 VITALS
DIASTOLIC BLOOD PRESSURE: 80 MMHG | BODY MASS INDEX: 20.88 KG/M2 | WEIGHT: 133 LBS | HEIGHT: 67 IN | SYSTOLIC BLOOD PRESSURE: 120 MMHG

## 2023-02-02 DIAGNOSIS — F32.2 CURRENT SEVERE EPISODE OF MAJOR DEPRESSIVE DISORDER WITHOUT PSYCHOTIC FEATURES WITHOUT PRIOR EPISODE (HCC): ICD-10-CM

## 2023-02-02 DIAGNOSIS — G89.28 OTHER CHRONIC POSTPROCEDURAL PAIN: Primary | ICD-10-CM

## 2023-02-02 DIAGNOSIS — F51.01 PRIMARY INSOMNIA: ICD-10-CM

## 2023-02-02 DIAGNOSIS — F41.1 GAD (GENERALIZED ANXIETY DISORDER): ICD-10-CM

## 2023-02-02 PROCEDURE — 3008F BODY MASS INDEX DOCD: CPT | Performed by: INTERNAL MEDICINE

## 2023-02-02 PROCEDURE — 3079F DIAST BP 80-89 MM HG: CPT | Performed by: INTERNAL MEDICINE

## 2023-02-02 PROCEDURE — 3074F SYST BP LT 130 MM HG: CPT | Performed by: INTERNAL MEDICINE

## 2023-02-02 PROCEDURE — 99214 OFFICE O/P EST MOD 30 MIN: CPT | Performed by: INTERNAL MEDICINE

## 2023-02-02 RX ORDER — HYDROCODONE BITARTRATE AND ACETAMINOPHEN 10; 325 MG/1; MG/1
1 TABLET ORAL EVERY 4 HOURS PRN
Qty: 120 TABLET | Refills: 0 | Status: SHIPPED | OUTPATIENT
Start: 2023-03-05 | End: 2023-04-04

## 2023-02-02 RX ORDER — ESCITALOPRAM OXALATE 20 MG/1
TABLET ORAL
Qty: 135 TABLET | Refills: 3 | Status: SHIPPED | OUTPATIENT
Start: 2023-02-02

## 2023-02-02 RX ORDER — HYDROCODONE BITARTRATE AND ACETAMINOPHEN 10; 325 MG/1; MG/1
1 TABLET ORAL EVERY 4 HOURS PRN
Qty: 120 TABLET | Refills: 0 | Status: SHIPPED | OUTPATIENT
Start: 2023-04-05 | End: 2023-05-05

## 2023-02-02 RX ORDER — HYDROCODONE BITARTRATE AND ACETAMINOPHEN 10; 325 MG/1; MG/1
1 TABLET ORAL EVERY 6 HOURS PRN
Qty: 120 TABLET | Refills: 0 | Status: SHIPPED | OUTPATIENT
Start: 2023-02-02

## 2023-02-02 RX ORDER — HYDROCODONE BITARTRATE AND ACETAMINOPHEN 10; 325 MG/1; MG/1
1 TABLET ORAL EVERY 4 HOURS PRN
Qty: 120 TABLET | Refills: 0 | Status: SHIPPED | OUTPATIENT
Start: 2023-02-02 | End: 2023-03-04

## 2023-03-13 RX ORDER — ZOLPIDEM TARTRATE 10 MG/1
10 TABLET ORAL NIGHTLY
Qty: 30 TABLET | Refills: 5 | Status: SHIPPED | OUTPATIENT
Start: 2023-03-13

## 2023-03-16 ENCOUNTER — PATIENT MESSAGE (OUTPATIENT)
Dept: INTERNAL MEDICINE CLINIC | Facility: CLINIC | Age: 63
End: 2023-03-16

## 2023-03-17 RX ORDER — ZOLPIDEM TARTRATE 10 MG/1
10 TABLET ORAL NIGHTLY
Qty: 30 TABLET | Refills: 5 | Status: SHIPPED | OUTPATIENT
Start: 2023-03-17

## 2023-03-17 NOTE — TELEPHONE ENCOUNTER
From: Chris Black  To: Florencia Cevallos MD  Sent: 3/16/2023 7:59 PM CDT  Subject: Ambien refill    [facility] did not receive the refill. If you could resend please. Thanks!    Michelle Gentile

## 2023-04-27 ENCOUNTER — NURSE ONLY (OUTPATIENT)
Dept: INTERNAL MEDICINE CLINIC | Facility: CLINIC | Age: 63
End: 2023-04-27
Payer: COMMERCIAL

## 2023-04-27 DIAGNOSIS — F32.2 CURRENT SEVERE EPISODE OF MAJOR DEPRESSIVE DISORDER WITHOUT PSYCHOTIC FEATURES WITHOUT PRIOR EPISODE (HCC): ICD-10-CM

## 2023-04-27 LAB
ALBUMIN SERPL-MCNC: 3.7 G/DL (ref 3.4–5)
ALBUMIN/GLOB SERPL: 1.1 {RATIO} (ref 1–2)
ALP LIVER SERPL-CCNC: 44 U/L
ALT SERPL-CCNC: 22 U/L
ANION GAP SERPL CALC-SCNC: 4 MMOL/L (ref 0–18)
AST SERPL-CCNC: 16 U/L (ref 15–37)
BASOPHILS # BLD AUTO: 0.06 X10(3) UL (ref 0–0.2)
BASOPHILS NFR BLD AUTO: 1.2 %
BILIRUB SERPL-MCNC: 0.5 MG/DL (ref 0.1–2)
BUN BLD-MCNC: 12 MG/DL (ref 7–18)
BUN/CREAT SERPL: 13 (ref 10–20)
CALCIUM BLD-MCNC: 9.3 MG/DL (ref 8.5–10.1)
CHLORIDE SERPL-SCNC: 109 MMOL/L (ref 98–112)
CHOLEST SERPL-MCNC: 200 MG/DL (ref ?–200)
CO2 SERPL-SCNC: 31 MMOL/L (ref 21–32)
CREAT BLD-MCNC: 0.92 MG/DL
DEPRECATED RDW RBC AUTO: 40.7 FL (ref 35.1–46.3)
EOSINOPHIL # BLD AUTO: 0.14 X10(3) UL (ref 0–0.7)
EOSINOPHIL NFR BLD AUTO: 2.8 %
ERYTHROCYTE [DISTWIDTH] IN BLOOD BY AUTOMATED COUNT: 11.8 % (ref 11–15)
FASTING PATIENT LIPID ANSWER: YES
FASTING STATUS PATIENT QL REPORTED: YES
GFR SERPLBLD BASED ON 1.73 SQ M-ARVRAT: 70 ML/MIN/1.73M2 (ref 60–?)
GLOBULIN PLAS-MCNC: 3.5 G/DL (ref 2.8–4.4)
GLUCOSE BLD-MCNC: 94 MG/DL (ref 70–99)
HCT VFR BLD AUTO: 47 %
HDLC SERPL-MCNC: 70 MG/DL (ref 40–59)
HGB BLD-MCNC: 15.4 G/DL
IMM GRANULOCYTES # BLD AUTO: 0.01 X10(3) UL (ref 0–1)
IMM GRANULOCYTES NFR BLD: 0.2 %
LDLC SERPL CALC-MCNC: 104 MG/DL (ref ?–100)
LYMPHOCYTES # BLD AUTO: 1.81 X10(3) UL (ref 1–4)
LYMPHOCYTES NFR BLD AUTO: 36.6 %
MCH RBC QN AUTO: 31.2 PG (ref 26–34)
MCHC RBC AUTO-ENTMCNC: 32.8 G/DL (ref 31–37)
MCV RBC AUTO: 95.1 FL
MONOCYTES # BLD AUTO: 0.43 X10(3) UL (ref 0.1–1)
MONOCYTES NFR BLD AUTO: 8.7 %
NEUTROPHILS # BLD AUTO: 2.5 X10 (3) UL (ref 1.5–7.7)
NEUTROPHILS # BLD AUTO: 2.5 X10(3) UL (ref 1.5–7.7)
NEUTROPHILS NFR BLD AUTO: 50.5 %
NONHDLC SERPL-MCNC: 130 MG/DL (ref ?–130)
OSMOLALITY SERPL CALC.SUM OF ELEC: 298 MOSM/KG (ref 275–295)
PLATELET # BLD AUTO: 262 10(3)UL (ref 150–450)
POTASSIUM SERPL-SCNC: 4.5 MMOL/L (ref 3.5–5.1)
PROT SERPL-MCNC: 7.2 G/DL (ref 6.4–8.2)
RBC # BLD AUTO: 4.94 X10(6)UL
SODIUM SERPL-SCNC: 144 MMOL/L (ref 136–145)
T4 FREE SERPL-MCNC: 1.1 NG/DL (ref 0.8–1.7)
TRIGL SERPL-MCNC: 153 MG/DL (ref 30–149)
TSI SER-ACNC: 0.82 MIU/ML (ref 0.36–3.74)
VLDLC SERPL CALC-MCNC: 26 MG/DL (ref 0–30)
WBC # BLD AUTO: 5 X10(3) UL (ref 4–11)

## 2023-04-27 PROCEDURE — 36415 COLL VENOUS BLD VENIPUNCTURE: CPT | Performed by: INTERNAL MEDICINE

## 2023-04-27 PROCEDURE — 80050 GENERAL HEALTH PANEL: CPT | Performed by: INTERNAL MEDICINE

## 2023-04-27 PROCEDURE — 80061 LIPID PANEL: CPT | Performed by: INTERNAL MEDICINE

## 2023-04-27 PROCEDURE — 84439 ASSAY OF FREE THYROXINE: CPT | Performed by: INTERNAL MEDICINE

## 2023-05-04 RX ORDER — HYDROCODONE BITARTRATE AND ACETAMINOPHEN 10; 325 MG/1; MG/1
1 TABLET ORAL EVERY 4 HOURS PRN
Qty: 20 TABLET | Refills: 0 | Status: SHIPPED | OUTPATIENT
Start: 2023-05-04

## 2023-05-26 RX ORDER — CLONAZEPAM 2 MG/1
2 TABLET ORAL 2 TIMES DAILY PRN
Qty: 60 TABLET | Refills: 5 | Status: SHIPPED | OUTPATIENT
Start: 2023-05-26

## 2023-06-16 RX ORDER — HYDROCODONE BITARTRATE AND ACETAMINOPHEN 10; 325 MG/1; MG/1
1 TABLET ORAL EVERY 4 HOURS PRN
Qty: 120 TABLET | Refills: 0 | Status: SHIPPED | OUTPATIENT
Start: 2023-06-16

## 2023-06-16 RX ORDER — ALENDRONATE SODIUM 70 MG/1
70 TABLET ORAL WEEKLY
Qty: 12 TABLET | Refills: 3 | Status: SHIPPED | OUTPATIENT
Start: 2023-06-16

## 2023-06-22 RX ORDER — HYDROCODONE BITARTRATE AND ACETAMINOPHEN 10; 325 MG/1; MG/1
1 TABLET ORAL EVERY 4 HOURS PRN
Qty: 120 TABLET | Refills: 0 | OUTPATIENT
Start: 2023-06-22

## 2023-06-29 ENCOUNTER — PATIENT MESSAGE (OUTPATIENT)
Dept: INTERNAL MEDICINE CLINIC | Facility: CLINIC | Age: 63
End: 2023-06-29

## 2023-07-03 RX ORDER — TRAMADOL HYDROCHLORIDE 50 MG/1
50 TABLET ORAL EVERY 6 HOURS PRN
Qty: 90 TABLET | Refills: 1 | Status: SHIPPED | OUTPATIENT
Start: 2023-07-03

## 2023-08-07 ENCOUNTER — OFFICE VISIT (OUTPATIENT)
Dept: INTERNAL MEDICINE CLINIC | Facility: CLINIC | Age: 63
End: 2023-08-07
Payer: COMMERCIAL

## 2023-08-07 VITALS
BODY MASS INDEX: 22.44 KG/M2 | SYSTOLIC BLOOD PRESSURE: 110 MMHG | WEIGHT: 143 LBS | DIASTOLIC BLOOD PRESSURE: 80 MMHG | HEIGHT: 67 IN | HEART RATE: 77 BPM | OXYGEN SATURATION: 96 % | TEMPERATURE: 99 F

## 2023-08-07 DIAGNOSIS — Z00.00 WELLNESS EXAMINATION: Primary | ICD-10-CM

## 2023-08-07 DIAGNOSIS — M96.1 FAILED BACK SYNDROME OF LUMBAR SPINE: ICD-10-CM

## 2023-08-07 DIAGNOSIS — G89.4 CHRONIC PAIN SYNDROME: ICD-10-CM

## 2023-08-07 PROCEDURE — 99396 PREV VISIT EST AGE 40-64: CPT | Performed by: INTERNAL MEDICINE

## 2023-08-07 PROCEDURE — 3074F SYST BP LT 130 MM HG: CPT | Performed by: INTERNAL MEDICINE

## 2023-08-07 PROCEDURE — 3079F DIAST BP 80-89 MM HG: CPT | Performed by: INTERNAL MEDICINE

## 2023-08-07 PROCEDURE — 3008F BODY MASS INDEX DOCD: CPT | Performed by: INTERNAL MEDICINE

## 2023-08-07 RX ORDER — HYDROCODONE BITARTRATE AND ACETAMINOPHEN 10; 325 MG/1; MG/1
1 TABLET ORAL EVERY 4 HOURS PRN
Qty: 120 TABLET | Refills: 0 | Status: SHIPPED | OUTPATIENT
Start: 2023-08-07

## 2023-09-11 DIAGNOSIS — G89.4 CHRONIC PAIN SYNDROME: ICD-10-CM

## 2023-09-11 RX ORDER — HYDROCODONE BITARTRATE AND ACETAMINOPHEN 10; 325 MG/1; MG/1
1 TABLET ORAL EVERY 4 HOURS PRN
Qty: 120 TABLET | Refills: 0 | Status: SHIPPED | OUTPATIENT
Start: 2023-09-11

## 2023-09-11 NOTE — TELEPHONE ENCOUNTER
A refill request was received for:  Requested Prescriptions     Pending Prescriptions Disp Refills    HYDROcodone-acetaminophen (NORCO)  MG Oral Tab 120 tablet 0     Sig: Take 1 tablet by mouth every 4 (four) hours as needed for Pain. Last refill date:  8/7/23    Last office visit: 8/7/23      No future appointments.

## 2023-09-19 RX ORDER — ZOLPIDEM TARTRATE 10 MG/1
10 TABLET ORAL NIGHTLY
Qty: 30 TABLET | Refills: 0 | OUTPATIENT
Start: 2023-09-19

## 2023-09-19 RX ORDER — ZOLPIDEM TARTRATE 10 MG/1
10 TABLET ORAL NIGHTLY
Qty: 30 TABLET | Refills: 5 | OUTPATIENT
Start: 2023-09-19

## 2023-09-19 NOTE — TELEPHONE ENCOUNTER
A refill request was received for:  Requested Prescriptions     Pending Prescriptions Disp Refills    ZOLPIDEM 10 MG Oral Tab [Pharmacy Med Name: ZOLPIDEM TARTRATE 10 MG TABLET] 30 tablet 0     Sig: TAKE 1 TABLET BY MOUTH EVERY DAY AT NIGHT     Last refill date:  3/17/23    Last office visit: 8/7/23      No future appointments.

## 2023-11-13 RX ORDER — CLONAZEPAM 2 MG/1
2 TABLET ORAL 2 TIMES DAILY PRN
Qty: 60 TABLET | Refills: 0 | Status: SHIPPED | OUTPATIENT
Start: 2023-11-13

## 2023-11-13 NOTE — TELEPHONE ENCOUNTER
A refill request was received for:  Requested Prescriptions     Pending Prescriptions Disp Refills    clonazePAM 2 MG Oral Tab 60 tablet 5     Sig: Take 1 tablet (2 mg total) by mouth 2 (two) times daily as needed for Anxiety.      Last refill date:  5/26/23    Last office visit: 8/7/23      Future Appointments   Date Time Provider De Rodriguez   11/16/2023  3:00 PM Desmond Rich, SHERIF Penny 44

## 2023-12-14 NOTE — PLAN OF CARE
Problem: ALTERED NUTRIENT INTAKE - ADULT  Goal: Nutrient intake appropriate for improving, restoring or maintaining nutritional needs  INTERVENTIONS:  - Assess nutritional status and recommend course of action  - Monitor oral intake, labs, and treatment pl No indicators present

## 2024-01-18 DIAGNOSIS — G89.4 CHRONIC PAIN SYNDROME: Primary | ICD-10-CM

## 2024-01-18 RX ORDER — HYDROCODONE BITARTRATE AND ACETAMINOPHEN 5; 325 MG/1; MG/1
2 TABLET ORAL EVERY 4 HOURS PRN
Qty: 240 TABLET | Refills: 0 | Status: SHIPPED | OUTPATIENT
Start: 2024-01-18

## 2024-02-13 DIAGNOSIS — G89.4 CHRONIC PAIN SYNDROME: ICD-10-CM

## 2024-02-14 RX ORDER — HYDROCODONE BITARTRATE AND ACETAMINOPHEN 5; 325 MG/1; MG/1
2 TABLET ORAL EVERY 4 HOURS PRN
Qty: 240 TABLET | Refills: 0 | Status: SHIPPED | OUTPATIENT
Start: 2024-02-14

## 2024-02-14 NOTE — TELEPHONE ENCOUNTER
A refill request was received for:  Requested Prescriptions     Pending Prescriptions Disp Refills    HYDROcodone-acetaminophen (NORCO) 5-325 MG Oral Tab 240 tablet 0     Sig: Take 2 tablets by mouth every 4 (four) hours as needed for Pain.     Last refill date:  1/18/24    Last office visit: 8/7/23      Future Appointments   Date Time Provider Department Center   2/22/2024 11:00 AM Varsha Harris APRN [facility]

## 2024-03-08 DIAGNOSIS — G89.4 CHRONIC PAIN SYNDROME: ICD-10-CM

## 2024-03-08 RX ORDER — HYDROCODONE BITARTRATE AND ACETAMINOPHEN 5; 325 MG/1; MG/1
2 TABLET ORAL EVERY 4 HOURS PRN
Qty: 240 TABLET | Refills: 0 | Status: SHIPPED | OUTPATIENT
Start: 2024-03-08

## 2024-03-08 NOTE — TELEPHONE ENCOUNTER
Future Appt. NO FUTURE APPT.     Last Visit: 08/07/2023    Medication Requested:  Requested Prescriptions     Pending Prescriptions Disp Refills    HYDROcodone-acetaminophen (NORCO) 5-325 MG Oral Tab 240 tablet 0     Sig: Take 2 tablets by mouth every 4 (four) hours as needed for Pain.      Last refill:      HYDROcodone-acetaminophen (NORCO) 5-325 MG Oral Tab 240 tablet 0 2/14/2024       Controlled Substance Medication [id number]/08/2024 10:59 AM    This medication is a controlled substance - forward to provider to refill

## 2024-04-04 DIAGNOSIS — G89.4 CHRONIC PAIN SYNDROME: ICD-10-CM

## 2024-04-04 RX ORDER — HYDROCODONE BITARTRATE AND ACETAMINOPHEN 5; 325 MG/1; MG/1
2 TABLET ORAL EVERY 4 HOURS PRN
Qty: 240 TABLET | Refills: 0 | Status: SHIPPED | OUTPATIENT
Start: 2024-04-04

## 2024-04-04 NOTE — TELEPHONE ENCOUNTER
A refill request was received for:  Requested Prescriptions     Pending Prescriptions Disp Refills    HYDROcodone-acetaminophen (NORCO) 5-325 MG Oral Tab 240 tablet 0     Sig: Take 2 tablets by mouth every 4 (four) hours as needed for Pain.     Last refill date:  3/8/24    Last office visit: 8/7/23      Future Appointments   Date Time Provider Department Center   4/17/2024  2:20 PM [facility]   5/2/2024 11:30 AM Varsha Harris APRN [facility]

## 2024-04-17 ENCOUNTER — HOSPITAL ENCOUNTER (OUTPATIENT)
Dept: MAMMOGRAPHY | Facility: HOSPITAL | Age: 64
Discharge: HOME | End: 2024-04-17
Attending: INTERNAL MEDICINE
Payer: COMMERCIAL

## 2024-04-17 DIAGNOSIS — Z12.31 SCREENING MAMMOGRAM, ENCOUNTER FOR: ICD-10-CM

## 2024-04-17 PROCEDURE — 77067 SCR MAMMO BI INCL CAD: CPT | Performed by: INTERNAL MEDICINE

## 2024-04-17 PROCEDURE — 77063 BREAST TOMOSYNTHESIS BI: CPT | Performed by: INTERNAL MEDICINE

## 2024-04-21 NOTE — PROGRESS NOTES
C/o vaginal discharge, heavy menstrual cramping, abdominal swelling, and vaginal odor since using a condom with intercourse 1 1/2 weeks ago.   HPI/Subjective:   Patient ID: Chantelle Fuentes is a 61year old female. HPI Pt here for a fu on ongoing issues with pain syndrome, insomnia and moiod disturbance. 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   History/Other:   Review of Syst light. Cardiovascular:      Rate and Rhythm: Normal rate and regular rhythm. Heart sounds: No murmur heard. Pulmonary:      Effort: Pulmonary effort is normal.      Breath sounds: Normal breath sounds. Abdominal:      Tenderness:  There is no g

## 2024-04-30 DIAGNOSIS — G89.4 CHRONIC PAIN SYNDROME: ICD-10-CM

## 2024-04-30 RX ORDER — HYDROCODONE BITARTRATE AND ACETAMINOPHEN 5; 325 MG/1; MG/1
2 TABLET ORAL EVERY 4 HOURS PRN
Qty: 240 TABLET | Refills: 0 | Status: SHIPPED | OUTPATIENT
Start: 2024-04-30

## 2024-04-30 NOTE — TELEPHONE ENCOUNTER
Future Appointment:None      Last Appointment:8/7/23      Last Refill:4/4/24      Medication Requested:   Requested Prescriptions     Pending Prescriptions Disp Refills    HYDROcodone-acetaminophen (NORCO) 5-325 MG Oral Tab 240 tablet 0     Sig: Take 2 tablets by mouth every 4 (four) hours as needed for Pain.       Protocol :     Controlled Substance Medication [id number]/30/2024 10:34 AM    This medication is a controlled substance - forward to provider to refill

## 2024-05-28 DIAGNOSIS — G89.4 CHRONIC PAIN SYNDROME: ICD-10-CM

## 2024-05-28 RX ORDER — HYDROCODONE BITARTRATE AND ACETAMINOPHEN 5; 325 MG/1; MG/1
2 TABLET ORAL EVERY 4 HOURS PRN
Qty: 240 TABLET | Refills: 0 | Status: SHIPPED | OUTPATIENT
Start: 2024-05-28

## 2024-05-28 NOTE — TELEPHONE ENCOUNTER
A refill request was received for:  Requested Prescriptions     Pending Prescriptions Disp Refills    HYDROcodone-acetaminophen (NORCO) 5-325 MG Oral Tab 240 tablet 0     Sig: Take 2 tablets by mouth every 4 (four) hours as needed for Pain.     Last refill date:  4/30/24    Last office visit: 8/7/23      Future Appointments   Date Time Provider Department Center   5/30/2024 11:30 AM Varsha Harris APRN [facility]

## 2024-06-06 ENCOUNTER — NURSE TRIAGE (OUTPATIENT)
Dept: INTERNAL MEDICINE CLINIC | Facility: CLINIC | Age: 64
End: 2024-06-06

## 2024-06-06 ENCOUNTER — PATIENT MESSAGE (OUTPATIENT)
Dept: INTERNAL MEDICINE CLINIC | Facility: CLINIC | Age: 64
End: 2024-06-06

## 2024-06-06 NOTE — TELEPHONE ENCOUNTER
Spoke with Melissa who stated for the past 6 months has been experiencing intermittent vision changes of a \" black hole\" in line of vision alternating each eye. There occurrences would be brief and happen 1 or 2 times a month. Last night the patient experiencing longer episode of right eye.Patient denied current sight impairment or eye pain. Patient stated her vision is currently normal.       Patient is requesting a referral to an Ophthalmologist.     Disposition: Seen within 2 weeks.    RN informed the patient if have prolonged vision disturbance or severe eye pan to seek ED evaluation. Patient voiced understanding.    Does the patient need to schedule with Dr. Mullins or send in referral for Ophthalmologist?    Please advise.    Reason for Disposition   Blurred vision or visual changes and gradual onset (e.g., weeks, months)    Answer Assessment - Initial Assessment Questions  1. DESCRIPTION: \"How has your vision changed?\" (e.g., complete vision loss, blurred vision, double vision, floaters, etc.)      Black hole   2. LOCATION: \"One or both eyes?\" If one, ask: \"Which eye?\"      [place]  3. SEVERITY: \"Can you see anything?\" If Yes, ask: \"What can you see?\" (e.g., fine print)      Denied vision change.  4. ONSET: \"When did this begin?\" \"Did it start suddenly or has this been gradual?\"      Months but episode last night right eye part of vision there is a black hold for 5 minutes.  5. PATTERN: \"Does this come and go, or has it been constant since it started?\"      Intermittent once or twice a month for 6 months  6. PAIN: \"Is there any pain in your eye(s)?\"  (Scale 1-10; or mild, moderate, severe)    - NONE (0): No pain.    - MILD (1-3): Doesn't interfere with normal activities.    - MODERATE (4-7): Interferes with normal activities or awakens from sleep.     - SEVERE (8-10): Excruciating pain, unable to do any normal activities.      Denied  7. CONTACTS-GLASSES: \"Do you wear contacts or glasses?\"      Reading  glasses  8. CAUSE: \"What do you think is causing this visual problem?\"      Unknown but mother has macular degeneration  9. OTHER SYMPTOMS: \"Do you have any other symptoms?\" (e.g., confusion, headache, arm or leg weakness, speech problems)      Denied  10. PREGNANCY: \"Is there any chance you are pregnant?\" \"When was your last menstrual period?\"        N/A    Protocols used: Vision Loss or Change-A-OH

## 2024-06-06 NOTE — TELEPHONE ENCOUNTER
From: Melissa Walsh  To: Lupe Mullins  Sent: 6/6/2024 4:07 PM CDT  Subject: Ophthalmologist     D  Hi Dr. Mullins. I need to see an ophthalmologist and would likecto get a recommendation. I am having intermittent patchy vision. Thanks!

## 2024-06-07 NOTE — TELEPHONE ENCOUNTER
Left a specific message to call  Dr. Coy's office to schedule first available appointment with this provider or his partner. [employer] notification message sent to the patient.

## 2024-06-08 ENCOUNTER — TELEPHONE (OUTPATIENT)
Dept: INTERNAL MEDICINE CLINIC | Facility: CLINIC | Age: 64
End: 2024-06-08

## 2024-06-08 DIAGNOSIS — H53.9 VISUAL DISTURBANCE: ICD-10-CM

## 2024-06-08 DIAGNOSIS — Z01.00 ROUTINE EYE EXAM: Primary | ICD-10-CM

## 2024-06-08 NOTE — TELEPHONE ENCOUNTER
Patient left message with answering service.    I called patient to confirm we will place message in system for Dr. Mullins's office hours on Mon., 06/10/2024.    See Chart Notes on previous conversation regarding a new referral for an Ophthalmologist.    Please call patient at:    [phone]    KG

## 2024-06-10 NOTE — TELEPHONE ENCOUNTER
Call returned to pt to discuss referral to potentially Dr Deep Tovar, or  the [facility] [place] (6 MDS): Antione Govea, David Ibrahim, Marilyn Goldberg, Lenard Mohr, Chance Pettit or Lani Main MD

## 2024-06-21 DIAGNOSIS — G89.4 CHRONIC PAIN SYNDROME: ICD-10-CM

## 2024-06-21 RX ORDER — HYDROCODONE BITARTRATE AND ACETAMINOPHEN 5; 325 MG/1; MG/1
2 TABLET ORAL EVERY 4 HOURS PRN
Qty: 240 TABLET | Refills: 0 | Status: SHIPPED | OUTPATIENT
Start: 2024-06-21

## 2024-06-21 RX ORDER — ALENDRONATE SODIUM 70 MG/1
70 TABLET ORAL WEEKLY
Qty: 12 TABLET | Refills: 3 | Status: SHIPPED | OUTPATIENT
Start: 2024-06-21

## 2024-06-21 NOTE — TELEPHONE ENCOUNTER
A refill request was received for:  Requested Prescriptions     Pending Prescriptions Disp Refills    alendronate 70 MG Oral Tab 12 tablet 3     Sig: Take 1 tablet (70 mg total) by mouth once a week.    HYDROcodone-acetaminophen (NORCO) 5-325 MG Oral Tab 240 tablet 0     Sig: Take 2 tablets by mouth every 4 (four) hours as needed for Pain.     Last refill date:  5/28/24    Last office visit: 8/7/23      Future Appointments   Date Time Provider Department Center   6/25/2024 10:30 AM Lizeth Proctor LCPC [facility] [place]   6/27/2024 12:00 PM Varsha Harris APRN [facility] [place]   8/8/2024  1:00 PM Lupe Mullins MD [facility] [address]

## 2024-07-17 DIAGNOSIS — G89.4 CHRONIC PAIN SYNDROME: ICD-10-CM

## 2024-07-17 RX ORDER — HYDROCODONE BITARTRATE AND ACETAMINOPHEN 5; 325 MG/1; MG/1
2 TABLET ORAL EVERY 4 HOURS PRN
Qty: 240 TABLET | Refills: 0 | Status: SHIPPED | OUTPATIENT
Start: 2024-07-17

## 2024-07-17 NOTE — TELEPHONE ENCOUNTER
A refill request was received for:  Requested Prescriptions     Pending Prescriptions Disp Refills    HYDROcodone-acetaminophen (NORCO) 5-325 MG Oral Tab 240 tablet 0     Sig: Take 2 tablets by mouth every 4 (four) hours as needed for Pain.     Last refill date:  6/21/24    Last office visit: 8/7/23      Future Appointments   Date Time Provider Department Center   7/17/2024 12:00 PM Lizeth Proctor [facility] [place]   7/25/2024 12:00 PM Varsha Harris APRN [facility] [place]   7/31/2024 12:00 PM Lizeth Proctor [facility] [place]   8/8/2024  1:00 PM Lupe Mullins MD [facility] [address]

## 2024-08-08 ENCOUNTER — OFFICE VISIT (OUTPATIENT)
Dept: INTERNAL MEDICINE CLINIC | Facility: CLINIC | Age: 64
End: 2024-08-08
Payer: COMMERCIAL

## 2024-08-08 VITALS
BODY MASS INDEX: 21.97 KG/M2 | HEIGHT: 67 IN | WEIGHT: 140 LBS | DIASTOLIC BLOOD PRESSURE: 80 MMHG | SYSTOLIC BLOOD PRESSURE: 130 MMHG

## 2024-08-08 DIAGNOSIS — G89.4 CHRONIC PAIN SYNDROME: ICD-10-CM

## 2024-08-08 DIAGNOSIS — Z12.31 SCREENING MAMMOGRAM, ENCOUNTER FOR: ICD-10-CM

## 2024-08-08 DIAGNOSIS — Z00.00 WELLNESS EXAMINATION: Primary | ICD-10-CM

## 2024-08-08 DIAGNOSIS — F32.0 CURRENT MILD EPISODE OF MAJOR DEPRESSIVE DISORDER WITHOUT PRIOR EPISODE (HCC): ICD-10-CM

## 2024-08-08 DIAGNOSIS — M81.8 OSTEOPOROSIS OF DISUSE: ICD-10-CM

## 2024-08-08 PROCEDURE — 88175 CYTOPATH C/V AUTO FLUID REDO: CPT | Performed by: INTERNAL MEDICINE

## 2024-08-08 PROCEDURE — 3079F DIAST BP 80-89 MM HG: CPT | Performed by: INTERNAL MEDICINE

## 2024-08-08 PROCEDURE — 99396 PREV VISIT EST AGE 40-64: CPT | Performed by: INTERNAL MEDICINE

## 2024-08-08 PROCEDURE — 3075F SYST BP GE 130 - 139MM HG: CPT | Performed by: INTERNAL MEDICINE

## 2024-08-08 PROCEDURE — 3008F BODY MASS INDEX DOCD: CPT | Performed by: INTERNAL MEDICINE

## 2024-08-08 NOTE — PROGRESS NOTES
Subjective:   Melissa Walsh is a 63 year old female who presents for Physical     Patient here for a wellness examination and fu on depression, insomnia, and osteoporosis and chronic pain on chronic opiate.  Depression is better on new meds and counseling. Is fatigued   Prob from medications.  Pain is controlled with 20mg of Norco twice a day.  History/Other:    Chief Complaint Reviewed and Verified  No Further Nursing Notes to   Review  Medications Reviewed         Tobacco:  She has never smoked tobacco.    Current Outpatient Medications   Medication Sig Dispense Refill    zolpidem 10 MG Oral Tab Take 1 tablet (10 mg total) by mouth nightly. 30 tablet 5    mirtazapine 15 MG Oral Tab Take 0.5 tablets (7.5 mg total) by mouth nightly as needed. 15 tablet 0    buPROPion  MG Oral Tablet 24 Hr Take 1 tablet (150 mg total) by mouth every morning. 30 tablet 0    clonazePAM 2 MG Oral Tab Take 1 tablet (2 mg total) by mouth 2 (two) times daily as needed for Anxiety. 60 tablet 0    sertraline (ZOLOFT) 50 MG Oral Tab Take 1 tablet (50 mg total) by mouth every morning. 30 tablet 0    HYDROcodone-acetaminophen (NORCO) 5-325 MG Oral Tab Take 2 tablets by mouth every 4 (four) hours as needed for Pain. 240 tablet 0    alendronate 70 MG Oral Tab Take 1 tablet (70 mg total) by mouth once a week. 12 tablet 3    omega-3 fatty acids 1000 MG Oral Cap Take 1,000 mg by mouth 2 (two) times daily.      Melatonin 5 MG Oral Tab Take 5-10mg by mouth at bedtime as needed for sleep      Naloxone HCl 4 MG/0.1ML Nasal Liquid 4 mg by Nasal route as needed. If patient remains unresponsive, repeat dose in other nostril 2-5 minutes after first dose. 1 kit 0    Multiple Vitamins-Minerals (WOMENS ONE DAILY) Oral Tab Take  by mouth.           Review of Systems:  Review of Systems   Constitutional:  Positive for fatigue.   Respiratory:  Negative for shortness of breath.    Cardiovascular:  Negative for chest pain, palpitations and leg swelling.    Gastrointestinal:         Gerd   Endocrine: Negative.    Genitourinary: Negative.    Musculoskeletal:  Positive for arthralgias (hands) and back pain. Negative for gait problem.   Neurological:  Negative for numbness.   Psychiatric/Behavioral:  Positive for dysphoric mood.          Objective:   /80   Ht 5' 7\" (1.702 m)   Wt 140 lb (63.5 kg)   BMI 21.93 kg/m²  Estimated body mass index is 21.93 kg/m² as calculated from the following:    Height as of this encounter: 5' 7\" (1.702 m).    Weight as of this encounter: 140 lb (63.5 kg).  Physical Exam  Constitutional:       Appearance: She is normal weight.   HENT:      Head: Normocephalic and atraumatic.      Right Ear: Ear canal normal.      Left Ear: Ear canal normal.   Eyes:      General: No scleral icterus.     Pupils: Pupils are equal, round, and reactive to light.   Cardiovascular:      Rate and Rhythm: Normal rate and regular rhythm.   Pulmonary:      Effort: Pulmonary effort is normal.      Breath sounds: Normal breath sounds.   Abdominal:      Palpations: Abdomen is soft.      Tenderness: There is no abdominal tenderness.   Genitourinary:     Comments: Breasts atrophic no masses  Ext genitalia atrophic no vag dc or bleeding Bimanual no masses  Musculoskeletal:      Cervical back: Neck supple.   Lymphadenopathy:      Cervical: No cervical adenopathy.   Skin:     Findings: No lesion.   Neurological:      Mental Status: She is alert. Mental status is at baseline.   Psychiatric:         Thought Content: Thought content normal.           Assessment & Plan:   1. Wellness examination (Primary) up to date on mammogram and colonoscopy  2. Screening mammogram, encounter form - next april  3. Current mild episode of major depressive disorder without prior episode (HCC) better on Zoloft and wellbutrin  4. Chronic pain syndrome Norco 20 mg bid        No follow-ups on file.    Lupe Mullins MD, 8/8/2024, 1:11 PM

## 2024-08-09 LAB
LAST PAP RESULT: NORMAL
PAP HISTORY (OTHER THAN LAST PAP): NORMAL

## 2024-08-13 DIAGNOSIS — G89.4 CHRONIC PAIN SYNDROME: ICD-10-CM

## 2024-08-13 RX ORDER — HYDROCODONE BITARTRATE AND ACETAMINOPHEN 5; 325 MG/1; MG/1
2 TABLET ORAL EVERY 4 HOURS PRN
Qty: 240 TABLET | Refills: 0 | Status: SHIPPED | OUTPATIENT
Start: 2024-08-13

## 2024-08-20 DIAGNOSIS — F51.01 PRIMARY INSOMNIA: ICD-10-CM

## 2024-08-20 RX ORDER — ZOLPIDEM TARTRATE 10 MG/1
10 TABLET ORAL NIGHTLY
Qty: 30 TABLET | Refills: 5 | Status: SHIPPED | OUTPATIENT
Start: 2024-08-20

## 2024-09-09 DIAGNOSIS — G89.4 CHRONIC PAIN SYNDROME: ICD-10-CM

## 2024-09-09 NOTE — TELEPHONE ENCOUNTER
A refill request was received for:  Requested Prescriptions     Pending Prescriptions Disp Refills    HYDROcodone-acetaminophen (NORCO)  MG Oral Tab 120 tablet 0     Sig: Take 1 tablet by mouth every 4 (four) hours as needed for Pain.     Last refill date:  8/15/24    Last office visit: 8/8/24      Future Appointments   Date Time Provider Department Center   9/11/2024 12:00 PM Lizeth Proctor LCPC [facility] [place]   9/19/2024 11:00 AM Varsha Harris APRN [facility] [place]

## 2024-09-10 RX ORDER — HYDROCODONE BITARTRATE AND ACETAMINOPHEN 10; 325 MG/1; MG/1
1 TABLET ORAL EVERY 4 HOURS PRN
Qty: 120 TABLET | Refills: 0 | Status: SHIPPED | OUTPATIENT
Start: 2024-09-10 | End: 2024-10-10

## 2024-10-02 DIAGNOSIS — G89.4 CHRONIC PAIN SYNDROME: ICD-10-CM

## 2024-10-03 RX ORDER — HYDROCODONE BITARTRATE AND ACETAMINOPHEN 10; 325 MG/1; MG/1
1 TABLET ORAL EVERY 4 HOURS PRN
Qty: 120 TABLET | Refills: 0 | Status: SHIPPED | OUTPATIENT
Start: 2024-10-03 | End: 2024-11-02

## 2024-10-28 DIAGNOSIS — G89.4 CHRONIC PAIN SYNDROME: ICD-10-CM

## 2024-10-28 NOTE — TELEPHONE ENCOUNTER
A refill request was received for:  Requested Prescriptions     Pending Prescriptions Disp Refills    HYDROcodone-acetaminophen (NORCO)  MG Oral Tab 120 tablet 0     Sig: Take 1 tablet by mouth every 4 (four) hours as needed for Pain.     Last refill date:  10/3/24    Last office visit: 8/8/24      Future Appointments   Date Time Provider Department Center   10/30/2024 12:00 PM Lizeth Proctor [facility]PC [facility] [place]   11/6/2024 12:00 PM Lizeth Proctor [facility] [place]   11/14/2024  1:00 PM Varsha Harris APRN [facility] [place]

## 2024-10-29 RX ORDER — HYDROCODONE BITARTRATE AND ACETAMINOPHEN 10; 325 MG/1; MG/1
1 TABLET ORAL EVERY 4 HOURS PRN
Qty: 120 TABLET | Refills: 0 | Status: SHIPPED | OUTPATIENT
Start: 2024-10-29 | End: 2024-11-28

## 2024-11-25 DIAGNOSIS — G89.4 CHRONIC PAIN SYNDROME: ICD-10-CM

## 2024-11-25 RX ORDER — HYDROCODONE BITARTRATE AND ACETAMINOPHEN 10; 325 MG/1; MG/1
1 TABLET ORAL EVERY 4 HOURS PRN
Qty: 120 TABLET | Refills: 0 | Status: SHIPPED | OUTPATIENT
Start: 2024-11-25 | End: 2024-12-25

## 2024-11-25 NOTE — TELEPHONE ENCOUNTER
A refill request was received for:  Requested Prescriptions     Pending Prescriptions Disp Refills    HYDROcodone-acetaminophen (NORCO)  MG Oral Tab 120 tablet 0     Sig: Take 1 tablet by mouth every 4 (four) hours as needed for Pain.     Last refill date:  10/29/24    Last office visit: 8/8/24      Future Appointments   Date Time Provider Department Center   12/4/2024  2:00 PM Lizeth Proctor [facility] [place]   1/7/2025  1:00 PM Varsha Harris APRN [facility] [place]

## 2024-12-20 DIAGNOSIS — G89.4 CHRONIC PAIN SYNDROME: ICD-10-CM

## 2024-12-20 RX ORDER — HYDROCODONE BITARTRATE AND ACETAMINOPHEN 10; 325 MG/1; MG/1
1 TABLET ORAL EVERY 4 HOURS PRN
Qty: 120 TABLET | Refills: 0 | Status: SHIPPED | OUTPATIENT
Start: 2024-12-20 | End: 2025-01-19

## 2024-12-20 NOTE — TELEPHONE ENCOUNTER
A refill request was received for:  Requested Prescriptions     Pending Prescriptions Disp Refills    HYDROcodone-acetaminophen (NORCO)  MG Oral Tab 120 tablet 0     Sig: Take 1 tablet by mouth every 4 (four) hours as needed for Pain.     Last refill date:  11/25/24    Last office visit: 8/8/24      Future Appointments   Date Time Provider Department Center   1/2/2025  2:00 PM Lizeth Proctor [facility] [place]   1/7/2025  1:00 PM Varsha Harris APRN [facility] [place]

## 2025-01-16 DIAGNOSIS — G89.4 CHRONIC PAIN SYNDROME: ICD-10-CM

## 2025-01-17 RX ORDER — HYDROCODONE BITARTRATE AND ACETAMINOPHEN 10; 325 MG/1; MG/1
1 TABLET ORAL EVERY 4 HOURS PRN
Qty: 120 TABLET | Refills: 0 | Status: SHIPPED | OUTPATIENT
Start: 2025-01-17 | End: 2025-02-16

## 2025-01-17 NOTE — TELEPHONE ENCOUNTER
Future Appt. NO FUTURE APPT.     Last Visit: 08/08/2024     Medication Requested:  Requested Prescriptions     Pending Prescriptions Disp Refills    HYDROcodone-acetaminophen (NORCO)  MG Oral Tab 120 tablet 0     Sig: Take 1 tablet by mouth every 4 (four) hours as needed for Pain.      Last refill:      Disp Refills Start End     HYDROcodone-acetaminophen (NORCO)  MG Oral Tab 120 tablet 0 12/20/2024 1/19/2025    Sig - Route: Take 1 tablet by mouth every 4 (four) hours as needed for Pain. - Oral      Controlled Substance Medication [id number]/16/2025 05:02 PM    This medication is a controlled substance - forward to provider to refill    Medication is active on med list

## 2025-02-09 DIAGNOSIS — G89.4 CHRONIC PAIN SYNDROME: ICD-10-CM

## 2025-02-09 DIAGNOSIS — F51.01 PRIMARY INSOMNIA: ICD-10-CM

## 2025-02-10 RX ORDER — ZOLPIDEM TARTRATE 10 MG/1
10 TABLET ORAL NIGHTLY
Qty: 30 TABLET | Refills: 5 | Status: SHIPPED | OUTPATIENT
Start: 2025-02-10

## 2025-02-10 RX ORDER — HYDROCODONE BITARTRATE AND ACETAMINOPHEN 10; 325 MG/1; MG/1
1 TABLET ORAL EVERY 4 HOURS PRN
Qty: 120 TABLET | Refills: 0 | Status: SHIPPED | OUTPATIENT
Start: 2025-02-10 | End: 2025-03-12

## 2025-02-18 ENCOUNTER — OFFICE VISIT (OUTPATIENT)
Dept: INTERNAL MEDICINE CLINIC | Facility: CLINIC | Age: 65
End: 2025-02-18
Payer: COMMERCIAL

## 2025-02-18 VITALS
SYSTOLIC BLOOD PRESSURE: 124 MMHG | OXYGEN SATURATION: 98 % | HEART RATE: 73 BPM | HEIGHT: 67 IN | WEIGHT: 126 LBS | BODY MASS INDEX: 19.78 KG/M2 | DIASTOLIC BLOOD PRESSURE: 80 MMHG

## 2025-02-18 DIAGNOSIS — G89.4 CHRONIC PAIN SYNDROME: Primary | ICD-10-CM

## 2025-02-18 DIAGNOSIS — M81.8 OSTEOPOROSIS OF DISUSE: ICD-10-CM

## 2025-02-18 DIAGNOSIS — F32.4 MAJOR DEPRESSIVE DISORDER IN PARTIAL REMISSION, UNSPECIFIED WHETHER RECURRENT: ICD-10-CM

## 2025-02-18 DIAGNOSIS — F51.01 PRIMARY INSOMNIA: ICD-10-CM

## 2025-02-18 PROCEDURE — 99214 OFFICE O/P EST MOD 30 MIN: CPT | Performed by: INTERNAL MEDICINE

## 2025-02-18 PROCEDURE — 3074F SYST BP LT 130 MM HG: CPT | Performed by: INTERNAL MEDICINE

## 2025-02-18 PROCEDURE — 3008F BODY MASS INDEX DOCD: CPT | Performed by: INTERNAL MEDICINE

## 2025-02-18 PROCEDURE — 3079F DIAST BP 80-89 MM HG: CPT | Performed by: INTERNAL MEDICINE

## 2025-02-18 NOTE — PROGRESS NOTES
Subjective:   Melissa Walsh is a 64 year old female who presents for Follow - Up     Patient here for fu on chronic opiate use for chronic pain following laminectomy.  Also fu on depression and insomnia.  This in partial remmision on sertraline 50 mg and klonopin.  History/Other:    Chief Complaint Reviewed and Verified  No Further Nursing Notes to   Review  Medications Reviewed  Problem List Reviewed         Tobacco: non smoker       Current Outpatient Medications   Medication Sig Dispense Refill    sertraline 50 MG Oral Tab Take 1 tablet (50 mg total) by mouth every morning. 90 tablet 3    zolpidem 10 MG Oral Tab Take 1 tablet (10 mg total) by mouth nightly. 30 tablet 5    HYDROcodone-acetaminophen (NORCO)  MG Oral Tab Take 1 tablet by mouth every 4 (four) hours as needed for Pain. 120 tablet 0    mirtazapine 15 MG Oral Tab Take 0.5 tablets (7.5 mg total) by mouth nightly as needed. 15 tablet 0    clonazePAM 2 MG Oral Tab Take 1 tablet (2 mg total) by mouth 2 (two) times daily as needed for Anxiety. 60 tablet 0    BUPROPION  MG Oral Tablet 24 Hr TAKE 1 TABLET BY MOUTH EVERY DAY IN THE MORNING 30 tablet 0    alendronate 70 MG Oral Tab Take 1 tablet (70 mg total) by mouth once a week. 12 tablet 3    omega-3 fatty acids 1000 MG Oral Cap Take 1,000 mg by mouth 2 (two) times daily.      Melatonin 5 MG Oral Tab Take 5-10mg by mouth at bedtime as needed for sleep      Naloxone HCl 4 MG/0.1ML Nasal Liquid 4 mg by Nasal route as needed. If patient remains unresponsive, repeat dose in other nostril 2-5 minutes after first dose. 1 kit 0    Multiple Vitamins-Minerals (WOMENS ONE DAILY) Oral Tab Take  by mouth.           Review of Systems:  Review of Systems   Musculoskeletal:  Positive for back pain.   Psychiatric/Behavioral:  Positive for sleep disturbance. Negative for dysphoric mood and suicidal ideas. The patient is nervous/anxious.          Objective:   /80   Pulse 73   Ht 5' 7\" (1.702 m)   Wt  126 lb (57.2 kg)   SpO2 98%   BMI 19.73 kg/m²  Estimated body mass index is 19.73 kg/m² as calculated from the following:    Height as of this encounter: 5' 7\" (1.702 m).    Weight as of this encounter: 126 lb (57.2 kg).  Physical Exam  HENT:      Head: Normocephalic and atraumatic.   Eyes:      General: No scleral icterus.     Pupils: Pupils are equal, round, and reactive to light.   Cardiovascular:      Rate and Rhythm: Normal rate and regular rhythm.   Pulmonary:      Effort: Pulmonary effort is normal.      Breath sounds: Normal breath sounds.   Abdominal:      Palpations: Abdomen is soft.   Musculoskeletal:         General: Tenderness (lumbar) present.      Cervical back: Neck supple.   Skin:     General: Skin is warm and dry.   Neurological:      Mental Status: She is alert and oriented to person, place, and time.   Psychiatric:         Thought Content: Thought content normal.           Assessment & Plan:   1. Chronic pain syndrome (Primary) continue hydrocodone 10mg trying  to decrease to 3 times daily   2. Primary insomnia zolpidem   3. Major depressive disorder in partial remission, unspecified whether recurrent on zoloft 50 mg  4. Osteoporosis of disuse on Fosamax 70 weekly  Other orders  -     Sertraline HCl; Take 1 tablet (50 mg total) by mouth every morning.  Dispense: 90 tablet; Refill: 3        No follow-ups on file.    Lupe Mullins MD, 2/18/2025, 1:16 PM

## 2025-03-04 DIAGNOSIS — G89.4 CHRONIC PAIN SYNDROME: ICD-10-CM

## 2025-03-05 NOTE — TELEPHONE ENCOUNTER
A refill request was received for:  Requested Prescriptions     Pending Prescriptions Disp Refills    HYDROcodone-acetaminophen (NORCO)  MG Oral Tab 120 tablet 0     Sig: Take 1 tablet by mouth every 4 (four) hours as needed for Pain.     Last refill date:  2/10/25    Last office visit: 2/18/25    Future Appointments   Date Time Provider Department Center   3/6/2025 12:00 PM Varsha Harris APRN [facility] [place]

## 2025-03-06 RX ORDER — HYDROCODONE BITARTRATE AND ACETAMINOPHEN 10; 325 MG/1; MG/1
1 TABLET ORAL EVERY 4 HOURS PRN
Qty: 120 TABLET | Refills: 0 | Status: SHIPPED | OUTPATIENT
Start: 2025-03-06 | End: 2025-04-05

## 2025-03-31 DIAGNOSIS — G89.4 CHRONIC PAIN SYNDROME: ICD-10-CM

## 2025-03-31 RX ORDER — HYDROCODONE BITARTRATE AND ACETAMINOPHEN 10; 325 MG/1; MG/1
1 TABLET ORAL EVERY 4 HOURS PRN
Qty: 120 TABLET | Refills: 0 | Status: SHIPPED | OUTPATIENT
Start: 2025-03-31 | End: 2025-04-30

## 2025-04-24 DIAGNOSIS — G89.4 CHRONIC PAIN SYNDROME: ICD-10-CM

## 2025-04-24 RX ORDER — HYDROCODONE BITARTRATE AND ACETAMINOPHEN 10; 325 MG/1; MG/1
1 TABLET ORAL EVERY 4 HOURS PRN
Qty: 120 TABLET | Refills: 0 | Status: SHIPPED | OUTPATIENT
Start: 2025-04-24 | End: 2025-05-24

## 2025-05-20 DIAGNOSIS — G89.4 CHRONIC PAIN SYNDROME: ICD-10-CM

## 2025-05-21 NOTE — TELEPHONE ENCOUNTER
04/24/2025  LAST WRITTEN: 04/24/2025  Quantity: 120    Recent Visits  Date Type Provider Dept   02/18/25 Office Visit Lupe Mullins MD [address]

## 2025-05-22 ENCOUNTER — HOSPITAL ENCOUNTER (OUTPATIENT)
Dept: MAMMOGRAPHY | Age: 65
Discharge: HOME | End: 2025-05-22
Attending: INTERNAL MEDICINE
Payer: COMMERCIAL

## 2025-05-22 ENCOUNTER — HOSPITAL ENCOUNTER (OUTPATIENT)
Dept: BONE DENSITY | Age: 65
Discharge: HOME | End: 2025-05-22
Attending: INTERNAL MEDICINE
Payer: COMMERCIAL

## 2025-05-22 DIAGNOSIS — Z12.31 SCREENING MAMMOGRAM, ENCOUNTER FOR: ICD-10-CM

## 2025-05-22 DIAGNOSIS — M81.8 OSTEOPOROSIS OF DISUSE: ICD-10-CM

## 2025-05-22 PROCEDURE — 77063 BREAST TOMOSYNTHESIS BI: CPT | Performed by: INTERNAL MEDICINE

## 2025-05-22 PROCEDURE — 77067 SCR MAMMO BI INCL CAD: CPT | Performed by: INTERNAL MEDICINE

## 2025-05-22 PROCEDURE — 77080 DXA BONE DENSITY AXIAL: CPT | Performed by: INTERNAL MEDICINE

## 2025-05-22 RX ORDER — HYDROCODONE BITARTRATE AND ACETAMINOPHEN 10; 325 MG/1; MG/1
1 TABLET ORAL EVERY 4 HOURS PRN
Qty: 120 TABLET | Refills: 0 | Status: SHIPPED | OUTPATIENT
Start: 2025-05-22 | End: 2025-06-21

## 2025-06-20 DIAGNOSIS — F51.01 PRIMARY INSOMNIA: ICD-10-CM

## 2025-06-23 RX ORDER — ZOLPIDEM TARTRATE 10 MG/1
10 TABLET ORAL NIGHTLY
Qty: 30 TABLET | Refills: 5 | OUTPATIENT
Start: 2025-06-23

## 2025-06-23 NOTE — TELEPHONE ENCOUNTER
Outpatient Medication Detail     Disp Refills Start End    zolpidem 10 MG Oral Tab 30 tablet 5 2/10/2025 --    Sig - Route: Take 1 tablet (10 mg total) by mouth nightly. - Oral    Sent to pharmacy as: Zolpidem Tartrate 10 MG Oral Tablet (Ambien)    Notes to Pharmacy: This request is for a new prescription for a controlled substance as required by Federal/State law.    E-Prescribing Status: Receipt confirmed by pharmacy (2/10/2025  1:38 PM CST)      Associated Diagnoses    Primary insomnia        Pharmacy    [facility]ACY #[id number] - [place], [place] - [address] AT [facility], [phone], [phone]

## 2025-06-24 ENCOUNTER — PATIENT MESSAGE (OUTPATIENT)
Dept: INTERNAL MEDICINE CLINIC | Facility: CLINIC | Age: 65
End: 2025-06-24

## 2025-06-24 DIAGNOSIS — M51.379 DEGENERATION OF INTERVERTEBRAL DISC OF LUMBOSACRAL REGION, UNSPECIFIED WHETHER PAIN PRESENT: Primary | ICD-10-CM

## 2025-06-25 RX ORDER — HYDROCODONE BITARTRATE AND ACETAMINOPHEN 5; 325 MG/1; MG/1
1 TABLET ORAL EVERY 4 HOURS PRN
Qty: 30 TABLET | Refills: 0 | Status: SHIPPED | OUTPATIENT
Start: 2025-06-25

## 2025-06-30 RX ORDER — ALENDRONATE SODIUM 70 MG/1
70 TABLET ORAL WEEKLY
Qty: 12 TABLET | Refills: 0 | Status: SHIPPED | OUTPATIENT
Start: 2025-06-30

## 2025-07-15 ENCOUNTER — OFFICE VISIT (OUTPATIENT)
Dept: INTERNAL MEDICINE CLINIC | Facility: CLINIC | Age: 65
End: 2025-07-15
Payer: COMMERCIAL

## 2025-07-15 VITALS
BODY MASS INDEX: 18.99 KG/M2 | OXYGEN SATURATION: 100 % | HEART RATE: 56 BPM | HEIGHT: 67 IN | WEIGHT: 121 LBS | SYSTOLIC BLOOD PRESSURE: 122 MMHG | DIASTOLIC BLOOD PRESSURE: 84 MMHG

## 2025-07-15 DIAGNOSIS — M81.8 OSTEOPOROSIS OF DISUSE: ICD-10-CM

## 2025-07-15 DIAGNOSIS — F51.02 ADJUSTMENT INSOMNIA: ICD-10-CM

## 2025-07-15 DIAGNOSIS — F32.4 MAJOR DEPRESSIVE DISORDER WITH SINGLE EPISODE, IN PARTIAL REMISSION: ICD-10-CM

## 2025-07-15 DIAGNOSIS — R29.898 WEAKNESS OF BOTH LOWER LIMBS: ICD-10-CM

## 2025-07-15 DIAGNOSIS — Z00.00 WELLNESS EXAMINATION: Primary | ICD-10-CM

## 2025-07-15 DIAGNOSIS — G89.4 CHRONIC PAIN SYNDROME: ICD-10-CM

## 2025-07-15 PROCEDURE — 3079F DIAST BP 80-89 MM HG: CPT | Performed by: INTERNAL MEDICINE

## 2025-07-15 PROCEDURE — 3074F SYST BP LT 130 MM HG: CPT | Performed by: INTERNAL MEDICINE

## 2025-07-15 PROCEDURE — 3008F BODY MASS INDEX DOCD: CPT | Performed by: INTERNAL MEDICINE

## 2025-07-15 PROCEDURE — 99396 PREV VISIT EST AGE 40-64: CPT | Performed by: INTERNAL MEDICINE

## 2025-07-15 NOTE — PROGRESS NOTES
Subjective:   Melissa Walsh is a 64 year old female who presents for Physical     Patient here for a annual wellness examination.  Has been weaning herself off of Norco which she has been able to wean down to only 5 mg at bedtime.  Has been using OTC meds and physical exercise.  Would like to get off of it altogether. Has intermittent bilateral leg weakness which lasts several seconds.No pain associated.  History/Other:    Chief Complaint Reviewed and Verified  No Further Nursing Notes to   Review  Medications Reviewed  Problem List Reviewed         Tobacco: non smoker  She has never smoked tobacco.    Current Medications[1]      Review of Systems:  Review of Systems   Constitutional:  Negative for fatigue and unexpected weight change.   Respiratory:  Negative for shortness of breath.    Cardiovascular:  Negative for chest pain, palpitations and leg swelling.   Gastrointestinal:  Negative for constipation.   Genitourinary: Negative.    Neurological:  Positive for weakness (legs).   Psychiatric/Behavioral:  Negative for dysphoric mood.          Objective:   /84   Pulse 56   Ht 5' 7\" (1.702 m)   Wt 121 lb (54.9 kg)   SpO2 100%   BMI 18.95 kg/m²  Estimated body mass index is 18.95 kg/m² as calculated from the following:    Height as of this encounter: 5' 7\" (1.702 m).    Weight as of this encounter: 121 lb (54.9 kg).  Physical Exam  Constitutional:       Appearance: She is normal weight.   HENT:      Head: Normocephalic and atraumatic.      Right Ear: There is impacted cerumen.      Left Ear: There is no impacted cerumen.   Eyes:      General: No scleral icterus.     Pupils: Pupils are equal, round, and reactive to light.   Neck:      Vascular: No carotid bruit.   Cardiovascular:      Rate and Rhythm: Normal rate and regular rhythm.   Pulmonary:      Effort: Pulmonary effort is normal.      Breath sounds: Normal breath sounds.   Abdominal:      General: Abdomen is flat. There is no distension.       Tenderness: There is no abdominal tenderness. There is no right CVA tenderness or left CVA tenderness.   Genitourinary:     Comments: Breasts dense architecture  no  nipple anomaly  Musculoskeletal:      Cervical back: Neck supple.      Right lower leg: No edema.      Left lower leg: No edema.   Lymphadenopathy:      Cervical: No cervical adenopathy.   Skin:     Findings: No lesion.   Neurological:      Mental Status: She is alert and oriented to person, place, and time. Mental status is at baseline.      Gait: Gait normal.      Comments: No motor weakness on exam ctrs equal normal and symmetrical   Psychiatric:         Behavior: Behavior normal.         Thought Content: Thought content normal.         Judgment: Judgment normal.           Assessment & Plan:   1. Wellness examination (Primary) check fasting labs  -     Comp Metabolic Panel (14); Future; Expected date: 07/15/2025  -     Lipid Panel; Future; Expected date: 07/15/2025  -     CBC With Differential With Platelet; Future; Expected date: 07/15/2025  2. Chronic pain syndrome - to wean off Norco 5 mg over next 30 days  3. Adjustment insomnia Zolpidem 10mg prn  4. Major depressive disorder with single episode, in partial remission sees NP on zoloft and wellbutrin  5. Osteoporosis of disuse alendronate  6. Weakness of both lower limbs - neuro consult and labs for myopathy  CK and esr  -     TSH and Free T4; Future; Expected date: 07/15/2025  -     Sed Antonio Segovia (Automated); Future; Expected date: 07/15/2025  -     Vitamin B12; Future; Expected date: 07/15/2025  -     CK (Creatine Kinase) (Not Creatinine); Future; Expected date: 07/15/2025  -     Neuro Referral - [facility] ([place])        No follow-ups on file.    Lupe Mullins MD, 7/15/2025, 1:11 PM        [1]   Current Outpatient Medications   Medication Sig Dispense Refill    clonazePAM 2 MG Oral Tab Take 1 tablet (2 mg total) by mouth 2 (two) times daily as needed for Anxiety. 60 tablet 0    alendronate  70 MG Oral Tab Take 1 tablet (70 mg total) by mouth once a week. 12 tablet 0    HYDROcodone-acetaminophen 5-325 MG Oral Tab Take 1 tablet by mouth every 4 (four) hours as needed for Pain. 30 tablet 0    buPROPion  MG Oral Tablet 24 Hr TAKE 1 TABLET BY MOUTH EVERY DAY IN THE MORNING 30 tablet 1    sertraline 50 MG Oral Tab Take 1 tablet (50 mg total) by mouth every morning. 90 tablet 3    zolpidem 10 MG Oral Tab Take 1 tablet (10 mg total) by mouth nightly. 30 tablet 5    omega-3 fatty acids 1000 MG Oral Cap Take 1,000 mg by mouth 2 (two) times daily.      Melatonin 5 MG Oral Tab Take 5-10mg by mouth at bedtime as needed for sleep      Multiple Vitamins-Minerals (WOMENS ONE DAILY) Oral Tab Take  by mouth.

## 2025-08-01 ENCOUNTER — LAB ENCOUNTER (OUTPATIENT)
Dept: LAB | Age: 65
End: 2025-08-01
Attending: INTERNAL MEDICINE

## 2025-08-01 DIAGNOSIS — R29.898 WEAKNESS OF BOTH LOWER LIMBS: ICD-10-CM

## 2025-08-01 DIAGNOSIS — Z00.00 WELLNESS EXAMINATION: ICD-10-CM

## 2025-08-01 LAB
ALBUMIN SERPL-MCNC: 4.6 G/DL (ref 3.2–4.8)
ALBUMIN/GLOB SERPL: 1.8 (ref 1–2)
ALP LIVER SERPL-CCNC: 54 U/L (ref 50–130)
ALT SERPL-CCNC: 17 U/L (ref 10–49)
ANION GAP SERPL CALC-SCNC: 6 MMOL/L (ref 0–18)
AST SERPL-CCNC: 20 U/L (ref ?–34)
BASOPHILS # BLD AUTO: 0.05 X10(3) UL (ref 0–0.2)
BASOPHILS NFR BLD AUTO: 1.1 %
BILIRUB SERPL-MCNC: 0.5 MG/DL (ref 0.2–1.1)
BUN BLD-MCNC: 13 MG/DL (ref 9–23)
BUN/CREAT SERPL: 11.3 (ref 10–20)
CALCIUM BLD-MCNC: 9.3 MG/DL (ref 8.7–10.4)
CHLORIDE SERPL-SCNC: 106 MMOL/L (ref 98–112)
CHOLEST SERPL-MCNC: 203 MG/DL (ref ?–200)
CK SERPL-CCNC: 59 U/L (ref 34–145)
CO2 SERPL-SCNC: 31 MMOL/L (ref 21–32)
CREAT BLD-MCNC: 1.15 MG/DL (ref 0.55–1.02)
DEPRECATED RDW RBC AUTO: 41 FL (ref 35.1–46.3)
EGFRCR SERPLBLD CKD-EPI 2021: 53 ML/MIN/1.73M2 (ref 60–?)
EOSINOPHIL # BLD AUTO: 0.16 X10(3) UL (ref 0–0.7)
EOSINOPHIL NFR BLD AUTO: 3.6 %
ERYTHROCYTE [DISTWIDTH] IN BLOOD BY AUTOMATED COUNT: 11.7 % (ref 11–15)
ERYTHROCYTE [SEDIMENTATION RATE] IN BLOOD: 6 MM/HR (ref 0–30)
FASTING PATIENT LIPID ANSWER: YES
FASTING STATUS PATIENT QL REPORTED: YES
GLOBULIN PLAS-MCNC: 2.5 G/DL (ref 2–3.5)
GLUCOSE BLD-MCNC: 82 MG/DL (ref 70–99)
HCT VFR BLD AUTO: 43.9 % (ref 35–48)
HDLC SERPL-MCNC: 70 MG/DL (ref 40–59)
HGB BLD-MCNC: 14.5 G/DL (ref 12–16)
IMM GRANULOCYTES # BLD AUTO: 0.01 X10(3) UL (ref 0–1)
IMM GRANULOCYTES NFR BLD: 0.2 %
LDLC SERPL CALC-MCNC: 111 MG/DL (ref ?–100)
LYMPHOCYTES # BLD AUTO: 1.89 X10(3) UL (ref 1–4)
LYMPHOCYTES NFR BLD AUTO: 42.4 %
MCH RBC QN AUTO: 31.3 PG (ref 26–34)
MCHC RBC AUTO-ENTMCNC: 33 G/DL (ref 31–37)
MCV RBC AUTO: 94.8 FL (ref 80–100)
MONOCYTES # BLD AUTO: 0.45 X10(3) UL (ref 0.1–1)
MONOCYTES NFR BLD AUTO: 10.1 %
NEUTROPHILS # BLD AUTO: 1.9 X10 (3) UL (ref 1.5–7.7)
NEUTROPHILS # BLD AUTO: 1.9 X10(3) UL (ref 1.5–7.7)
NEUTROPHILS NFR BLD AUTO: 42.6 %
NONHDLC SERPL-MCNC: 133 MG/DL (ref ?–130)
OSMOLALITY SERPL CALC.SUM OF ELEC: 295 MOSM/KG (ref 275–295)
PLATELET # BLD AUTO: 253 10(3)UL (ref 150–450)
POTASSIUM SERPL-SCNC: 4.2 MMOL/L (ref 3.5–5.1)
PROT SERPL-MCNC: 7.1 G/DL (ref 5.7–8.2)
RBC # BLD AUTO: 4.63 X10(6)UL (ref 3.8–5.3)
SODIUM SERPL-SCNC: 143 MMOL/L (ref 136–145)
T4 FREE SERPL-MCNC: 1.2 NG/DL (ref 0.8–1.7)
TRIGL SERPL-MCNC: 124 MG/DL (ref 30–149)
TSI SER-ACNC: 1.97 UIU/ML (ref 0.55–4.78)
VIT B12 SERPL-MCNC: 571 PG/ML (ref 211–911)
VLDLC SERPL CALC-MCNC: 21 MG/DL (ref 0–30)
WBC # BLD AUTO: 4.5 X10(3) UL (ref 4–11)

## 2025-08-01 PROCEDURE — 85652 RBC SED RATE AUTOMATED: CPT

## 2025-08-01 PROCEDURE — 36415 COLL VENOUS BLD VENIPUNCTURE: CPT

## 2025-08-01 PROCEDURE — 80053 COMPREHEN METABOLIC PANEL: CPT

## 2025-08-01 PROCEDURE — 80061 LIPID PANEL: CPT

## 2025-08-01 PROCEDURE — 82550 ASSAY OF CK (CPK): CPT

## 2025-08-01 PROCEDURE — 82607 VITAMIN B-12: CPT

## 2025-08-01 PROCEDURE — 84443 ASSAY THYROID STIM HORMONE: CPT

## 2025-08-01 PROCEDURE — 84439 ASSAY OF FREE THYROXINE: CPT

## 2025-08-01 PROCEDURE — 85025 COMPLETE CBC W/AUTO DIFF WBC: CPT

## 2025-08-21 DIAGNOSIS — F51.01 PRIMARY INSOMNIA: ICD-10-CM

## 2025-08-28 RX ORDER — ZOLPIDEM TARTRATE 10 MG/1
10 TABLET ORAL NIGHTLY
Qty: 30 TABLET | Refills: 5 | Status: SHIPPED | OUTPATIENT
Start: 2025-08-28

## (undated) DEVICE — SOL H2O 1000ML BTL

## (undated) DEVICE — APPLICATOR FBRTP 6IN STRL LF

## (undated) DEVICE — STERILE LATEX POWDER-FREE SURGICAL GLOVESWITH NITRILE COATING: Brand: PROTEXIS

## (undated) DEVICE — Device: Brand: DEFENDO AIR/WATER/SUCTION AND BIOPSY VALVE

## (undated) DEVICE — TROCAR: Brand: KII® SLEEVE

## (undated) DEVICE — SUTURE VICRYL 5-0 J495H

## (undated) DEVICE — SUTURE SILK 3-0 C017D

## (undated) DEVICE — 3M™ STERI-STRIP™ REINFORCED ADHESIVE SKIN CLOSURES, R1547, 1/2 IN X 4 IN (12 MM X 100 MM), 6 STRIPS/ENVELOPE: Brand: 3M™ STERI-STRIP™

## (undated) DEVICE — SUTURE PDS II 0 CTX

## (undated) DEVICE — 6 ML SYRINGE LUER-LOCK TIP: Brand: MONOJECT

## (undated) DEVICE — TROCAR KII OPTICAL ACCESS SYST

## (undated) DEVICE — TROCAR: Brand: KII FIOS FIRST ENTRY

## (undated) DEVICE — DRAIN SILICONE FLAT 7MM

## (undated) DEVICE — Device: Brand: JELCO

## (undated) DEVICE — SUTURE VICRYL 0 CT-1

## (undated) DEVICE — SOL  .9 3000ML

## (undated) DEVICE — UNDYED BRAIDED (POLYGLACTIN 910), SYNTHETIC ABSORBABLE SUTURE: Brand: COATED VICRYL

## (undated) DEVICE — DEVICE PORT SITE CLOSURE

## (undated) DEVICE — 35 ML SYRINGE REGULAR TIP: Brand: MONOJECT

## (undated) DEVICE — 12 ML SYRINGE LUER-LOCK TIP: Brand: MONOJECT

## (undated) DEVICE — TRAY FOLEY BDX 16F STATLOCK

## (undated) DEVICE — INSUFFLATION NEEDLE TO ESTABLISH PNEUMOPERITONEUM.: Brand: INSUFFLATION NEEDLE

## (undated) DEVICE — MEDI-VAC NON-CONDUCTIVE SUCTION TUBING 6MM X 1.8M (6FT.) L: Brand: CARDINAL HEALTH

## (undated) DEVICE — [HIGH FLOW INSUFFLATOR,  DO NOT USE IF PACKAGE IS DAMAGED,  KEEP DRY,  KEEP AWAY FROM SUNLIGHT,  PROTECT FROM HEAT AND RADIOACTIVE SOURCES.]: Brand: PNEUMOSURE

## (undated) DEVICE — SUTURE SILK PERMA 2-0 D7793

## (undated) DEVICE — SUTURE VICRYL 0 J906G

## (undated) DEVICE — GOWN SURG AERO BLUE PERF XLG

## (undated) DEVICE — CULTURE TUBE ANAEROBIC

## (undated) DEVICE — NEEDLE HPO 18GA 1.5IN ECLPS

## (undated) DEVICE — DRAIN RESERVOIR RELIAVAC 100CC

## (undated) DEVICE — CONMED SCOPE SAVER BITE BLOCK, 20X27 MM: Brand: SCOPE SAVER

## (undated) DEVICE — DISPOSABLE SUCTION/IRRIGATOR TUBE SET: Brand: AHTO

## (undated) DEVICE — FORCEP RADIAL JAW 4

## (undated) DEVICE — Device: Brand: CUSTOM PROCEDURE KIT

## (undated) DEVICE — ADHESIVE MASTISOL 2/3CC VL

## (undated) DEVICE — 3 ML SYRINGE LUER-LOCK TIP: Brand: MONOJECT

## (undated) DEVICE — COVER STND 54X23IN MAYO REINF

## (undated) DEVICE — ENDOPATH ETS-FLEX45 ARTICULATING ENDOSCOPIC LINEAR CUTTER, NO RELOAD: Brand: ENDOPATH

## (undated) DEVICE — SOL  .9 1000ML BTL

## (undated) DEVICE — SUTURE SILK 2-0 685H

## (undated) DEVICE — SUTURE VICRYL 3-0 J442H

## (undated) DEVICE — LINE MNTR ADLT SET O2 INTMD

## (undated) DEVICE — LAP CHOLE: Brand: MEDLINE INDUSTRIES, INC.

## (undated) DEVICE — CULTURE COLLECT/TRANSPORT SYS

## (undated) DEVICE — ENDOPATH ETS45 2.5MM RELOADS (VASCULAR/THIN): Brand: ENDOPATH

## (undated) DEVICE — TISSUE RETRIEVAL SYSTEM: Brand: INZII RETRIEVAL SYSTEM

## (undated) NOTE — MR AVS SNAPSHOT
[address] [facility] [phone]  [phone]               Thank you for choosing us for your health care visit with Brina Henning MD.  We are glad to serve you and happy to provide you with [facility] Zolpidem Tartrate 10 MG Tabs   TAKE 1 TABLET BY MOUTH EVERY EVENING AT BEDTIME   Commonly known as:  AMBIEN                Where to Get Your Medications      [id number] [place] [facility] [address]  AT [facility]

## (undated) NOTE — Clinical Note
Date: 5/9/2017    Patient Name: Lyssa Larios          To Whom it may concern: This letter has been written at the patient's request. The above patient was seen at the [facility] for treatment of a medical condition.     FMLA papers pre d

## (undated) NOTE — MR AVS SNAPSHOT
[address] at [phone] [phone]               Thank you for choosing us for your health care visit with Jaspreet Matthews MD.  We are glad to serve you and happy to provide you with [facility] [address], [address]  [address] [address], [place]    It is the patient's responsibility to check with and follow their insurance company's guidelines for prior authorization for this test.  You may be held resp * Zolpidem Tartrate 10 MG Tabs   Take 1 tablet (10 mg total) by mouth nightly. Commonly known as:  AMBIEN           * Notice: This list has 4 medication(s) that are the same as other medications prescribed for you.  Read the directions carefully, and

## (undated) NOTE — LETTER
[facility]  Administration of Anesthesia  1. I, Ev Yuen, or _________________________________ acting on her behalf, (Patient) (Dependent/Representative) request to receive anesthesia for my pending procedure/operation/treatment.   A infections, high spinal block, spinal bleeding, seizure, cardiac arrest and death. 7. AWARENESS: I understand that it is possible (but unlikely) to have explicit memory of events from the operating room while under general anesthesia.   8. ELECTROCONVULSIV unconscious pt /Relationship    My signature below affirms that prior to the time of the procedure, I have explained to the patient and/or his/her guardian, the risks and benefits of undergoing anesthesia, as well as any reasonable alternatives.     _______

## (undated) NOTE — LETTER
Hospital Discharge Documentation  Please phone to schedule a hospital follow up appointment. No discharge summary available at this time, below is the most recent progress note  for your review .         From: [address] Hospitalist's Office  Phone: [phone] in 1 TPN   Intravenous Continuous TPN   Heparin Sodium (Porcine) 5000 UNIT/ML injection 5,000 Units 5,000 Units Subcutaneous 2 times per day   ondansetron HCl (ZOFRAN) injection 8 mg 8 mg Intravenous Q6H PRN   Metoclopramide HCl (REGLAN) injection K  4.2  4.0  4.0   CL  104  104  108   CO2  27  28  26                  Assessment and Plan:         Acute appendicitis with perforation with sbo s/p resurg now passing gas and had bm   - cx with candida and e coli - cont zosyn and diflucan  S/p lap appy b

## (undated) NOTE — MR AVS SNAPSHOT
[address] [phone]               Thank you for choosing us for your health care visit with [place] [address] NURSE.   We are glad to serve you and happy to provide you with this summ * Zolpidem Tartrate 10 MG Tabs   TAKE 1 TABLET BY MOUTH EVERY EVENING AT BEDTIME   Commonly known as:  AMBIEN           * Zolpidem Tartrate 10 MG Tabs   Take 1 tablet (10 mg total) by mouth nightly. Commonly known as:  AMBIEN           * Notice:   This l

## (undated) NOTE — LETTER
[address], [place], [place]     AUTHORIZATION FOR SURGICAL OPERATION OR PROCEDURE    I hereby authorize Dr. Joanne Almeida MD, my Physician(s) and whomever may be designated as the doctor's Assistant, to perform th 4. I consent to the photographing of procedure(s) to be performed for the purposes of advancing medicine, science and/or education, provided my identity is not revealed.  If the procedure has been videotaped, the physician/surgeon will obtain the original v (Witness signature)                                                                                                  (Date)                                (Time)  STATEMENT OF PHYSICIAN My signature below affirms that prior to the time of the procedure;  I

## (undated) NOTE — LETTER
[address], [place]  Authorization for Invasive Procedures  1.  I hereby authorize Dr. Deja Knutson , my physician and whomever may be designated as the doctor's assistant, to perform the following operation and/or procedure:  Col performed for the purposes of advancing medicine, science, and/or education, provided my identity is not revealed. If the procedure has been videotaped, the physician/surgeon will obtain the original videotape.  The hospital will not be responsible for stor My signature below affirms that prior to the time of the procedure, I have explained to the patient and/or her legal representative, the risks and benefits involved in the proposed treatment and any reasonable alternative to the proposed treatment.  I have

## (undated) NOTE — LETTER
[place] [facility]  Administration of Anesthesia  1. I, Erich Prabha, or _________________________________ acting on her behalf, (Patient) (Dependent/Representative) request to receive anesthesia for my pending procedure/operation/treatment.   A infections, high spinal block, spinal bleeding, seizure, cardiac arrest and death. 7. AWARENESS: I understand that it is possible (but unlikely) to have explicit memory of events from the operating room while under general anesthesia.   8. ELECTROCONVULSIV unconscious pt /Relationship    My signature below affirms that prior to the time of the procedure, I have explained to the patient and/or his/her guardian, the risks and benefits of undergoing anesthesia, as well as any reasonable alternatives.     _______